# Patient Record
Sex: MALE | Race: OTHER | Employment: OTHER | URBAN - METROPOLITAN AREA
[De-identification: names, ages, dates, MRNs, and addresses within clinical notes are randomized per-mention and may not be internally consistent; named-entity substitution may affect disease eponyms.]

---

## 2020-01-03 ENCOUNTER — APPOINTMENT (OUTPATIENT)
Dept: CT IMAGING | Age: 45
End: 2020-01-03
Attending: EMERGENCY MEDICINE
Payer: COMMERCIAL

## 2020-01-03 ENCOUNTER — HOSPITAL ENCOUNTER (EMERGENCY)
Age: 45
Discharge: HOME | End: 2020-01-03
Attending: EMERGENCY MEDICINE
Payer: COMMERCIAL

## 2020-01-03 VITALS
HEIGHT: 74 IN | OXYGEN SATURATION: 94 % | SYSTOLIC BLOOD PRESSURE: 111 MMHG | DIASTOLIC BLOOD PRESSURE: 74 MMHG | TEMPERATURE: 98 F | BODY MASS INDEX: 28.23 KG/M2 | HEART RATE: 79 BPM | WEIGHT: 220 LBS | RESPIRATION RATE: 17 BRPM

## 2020-01-03 DIAGNOSIS — G44.009 CLUSTER HEADACHE, NOT INTRACTABLE, UNSPECIFIED CHRONICITY PATTERN: Primary | ICD-10-CM

## 2020-01-03 LAB
ALBUMIN SERPL-MCNC: 4 G/DL (ref 3.5–5)
ALBUMIN/GLOB SERPL: 1.1 {RATIO} (ref 1.1–2.2)
ALP SERPL-CCNC: 106 U/L (ref 45–117)
ALT SERPL-CCNC: 33 U/L (ref 12–78)
ANION GAP SERPL CALC-SCNC: 5 MMOL/L (ref 5–15)
AST SERPL-CCNC: 16 U/L (ref 15–37)
BASOPHILS # BLD: 0 K/UL (ref 0–0.1)
BASOPHILS NFR BLD: 0 % (ref 0–1)
BILIRUB SERPL-MCNC: 1.1 MG/DL (ref 0.2–1)
BUN SERPL-MCNC: 16 MG/DL (ref 6–20)
BUN/CREAT SERPL: 18 (ref 12–20)
CALCIUM SERPL-MCNC: 8.9 MG/DL (ref 8.5–10.1)
CHLORIDE SERPL-SCNC: 103 MMOL/L (ref 97–108)
CO2 SERPL-SCNC: 27 MMOL/L (ref 21–32)
COMMENT, HOLDF: NORMAL
CREAT SERPL-MCNC: 0.91 MG/DL (ref 0.7–1.3)
CRP SERPL-MCNC: 2.5 MG/DL (ref 0–0.6)
DIFFERENTIAL METHOD BLD: ABNORMAL
EOSINOPHIL # BLD: 0 K/UL (ref 0–0.4)
EOSINOPHIL NFR BLD: 0 % (ref 0–7)
ERYTHROCYTE [DISTWIDTH] IN BLOOD BY AUTOMATED COUNT: 12.8 % (ref 11.5–14.5)
ERYTHROCYTE [SEDIMENTATION RATE] IN BLOOD: 7 MM/HR (ref 0–15)
GLOBULIN SER CALC-MCNC: 3.8 G/DL (ref 2–4)
GLUCOSE SERPL-MCNC: 129 MG/DL (ref 65–100)
HCT VFR BLD AUTO: 48.4 % (ref 36.6–50.3)
HGB BLD-MCNC: 17 G/DL (ref 12.1–17)
IMM GRANULOCYTES # BLD AUTO: 0 K/UL (ref 0–0.04)
IMM GRANULOCYTES NFR BLD AUTO: 0 % (ref 0–0.5)
LYMPHOCYTES # BLD: 1.6 K/UL (ref 0.8–3.5)
LYMPHOCYTES NFR BLD: 12 % (ref 12–49)
MCH RBC QN AUTO: 29.8 PG (ref 26–34)
MCHC RBC AUTO-ENTMCNC: 35.1 G/DL (ref 30–36.5)
MCV RBC AUTO: 84.9 FL (ref 80–99)
MONOCYTES # BLD: 1.1 K/UL (ref 0–1)
MONOCYTES NFR BLD: 9 % (ref 5–13)
NEUTS SEG # BLD: 9.9 K/UL (ref 1.8–8)
NEUTS SEG NFR BLD: 79 % (ref 32–75)
NRBC # BLD: 0 K/UL (ref 0–0.01)
NRBC BLD-RTO: 0 PER 100 WBC
PLATELET # BLD AUTO: 160 K/UL (ref 150–400)
PMV BLD AUTO: 12.3 FL (ref 8.9–12.9)
POTASSIUM SERPL-SCNC: 3.4 MMOL/L (ref 3.5–5.1)
PROT SERPL-MCNC: 7.8 G/DL (ref 6.4–8.2)
RBC # BLD AUTO: 5.7 M/UL (ref 4.1–5.7)
SAMPLES BEING HELD,HOLD: NORMAL
SODIUM SERPL-SCNC: 135 MMOL/L (ref 136–145)
WBC # BLD AUTO: 12.6 K/UL (ref 4.1–11.1)

## 2020-01-03 PROCEDURE — 80053 COMPREHEN METABOLIC PANEL: CPT

## 2020-01-03 PROCEDURE — 96375 TX/PRO/DX INJ NEW DRUG ADDON: CPT

## 2020-01-03 PROCEDURE — 96374 THER/PROPH/DIAG INJ IV PUSH: CPT

## 2020-01-03 PROCEDURE — 70450 CT HEAD/BRAIN W/O DYE: CPT

## 2020-01-03 PROCEDURE — 86140 C-REACTIVE PROTEIN: CPT

## 2020-01-03 PROCEDURE — 85025 COMPLETE CBC W/AUTO DIFF WBC: CPT

## 2020-01-03 PROCEDURE — 74011250636 HC RX REV CODE- 250/636: Performed by: EMERGENCY MEDICINE

## 2020-01-03 PROCEDURE — 85652 RBC SED RATE AUTOMATED: CPT

## 2020-01-03 PROCEDURE — 99284 EMERGENCY DEPT VISIT MOD MDM: CPT

## 2020-01-03 RX ORDER — DIPHENHYDRAMINE HYDROCHLORIDE 50 MG/ML
50 INJECTION, SOLUTION INTRAMUSCULAR; INTRAVENOUS
Status: COMPLETED | OUTPATIENT
Start: 2020-01-03 | End: 2020-01-03

## 2020-01-03 RX ORDER — NAPROXEN 500 MG/1
500 TABLET ORAL 2 TIMES DAILY WITH MEALS
Qty: 30 TAB | Refills: 0 | Status: SHIPPED | OUTPATIENT
Start: 2020-01-03 | End: 2020-01-08

## 2020-01-03 RX ORDER — METOCLOPRAMIDE HYDROCHLORIDE 5 MG/ML
10 INJECTION INTRAMUSCULAR; INTRAVENOUS
Status: COMPLETED | OUTPATIENT
Start: 2020-01-03 | End: 2020-01-03

## 2020-01-03 RX ORDER — KETOROLAC TROMETHAMINE 30 MG/ML
30 INJECTION, SOLUTION INTRAMUSCULAR; INTRAVENOUS
Status: COMPLETED | OUTPATIENT
Start: 2020-01-03 | End: 2020-01-03

## 2020-01-03 RX ORDER — ONDANSETRON 8 MG/1
8 TABLET, ORALLY DISINTEGRATING ORAL
Qty: 15 TAB | Refills: 0 | Status: SHIPPED | OUTPATIENT
Start: 2020-01-03 | End: 2020-01-08

## 2020-01-03 RX ADMIN — DIPHENHYDRAMINE HYDROCHLORIDE 50 MG: 50 INJECTION, SOLUTION INTRAMUSCULAR; INTRAVENOUS at 05:43

## 2020-01-03 RX ADMIN — METOCLOPRAMIDE 10 MG: 5 INJECTION, SOLUTION INTRAMUSCULAR; INTRAVENOUS at 05:43

## 2020-01-03 RX ADMIN — SODIUM CHLORIDE 1000 ML: 900 INJECTION, SOLUTION INTRAVENOUS at 05:30

## 2020-01-03 RX ADMIN — KETOROLAC TROMETHAMINE 30 MG: 30 INJECTION, SOLUTION INTRAMUSCULAR at 05:43

## 2020-01-03 NOTE — ED TRIAGE NOTES
Pt C/O intermittent right sided headache x 1 month. Pain has become constant. Pt denies any weakness or visual changes. Pt has not had a head CT done.

## 2020-01-03 NOTE — DISCHARGE INSTRUCTIONS
Patient Education        Cluster Headache: Care Instructions  Your Care Instructions  Cluster headaches are very painful. They happen on one side of the head and often start at night. They can last for 30 minutes to several hours. They usually occur in groups, or clusters, over weeks or months. You may have a stuffy nose and watery eyes during the headaches. The cause of cluster headaches is not known. Medicine may help prevent cluster headaches. You also can try to avoid things that trigger your headaches. Follow-up care is a key part of your treatment and safety. Be sure to make and go to all appointments, and call your doctor if you are having problems. It's also a good idea to know your test results and keep a list of the medicines you take. How can you care for yourself at home? · Watch for new symptoms with a headache. These include fever, weakness or numbness, vision changes, or confusion. They may be signs of a more serious problem. · Be safe with medicines. Take your medicines exactly as prescribed. Call your doctor if you think you are having a problem with your medicine. You will get more details on the specific medicines your doctor prescribes. · If your doctor recommends it, take an over-the-counter pain medicine, such as acetaminophen (Tylenol), ibuprofen (Advil, Motrin), or naproxen (Aleve). Read and follow all instructions on the label. · Do not take two or more pain medicines at the same time unless the doctor told you to. Many pain medicines have acetaminophen, which is Tylenol. Too much acetaminophen (Tylenol) can be harmful. · Carry medicine with you to quickly treat a headache. · Put ice or a cold pack on the area for 10 to 20 minutes at a time. Put a thin cloth between the ice and your skin. · If your doctor prescribed at-home oxygen therapy to stop a cluster headache, follow the directions for using it. To prevent cluster headaches  · Keep a headache diary.  Avoiding triggers may help you prevent headaches. Write down when a headache begins, how long it lasts, and what might have triggered it. This could include stress, alcohol, or certain foods. · Exercise daily to lower stress. · Limit caffeine by not drinking too much coffee, tea, or soda. But do not quit caffeine suddenly. This can also give you headaches. · Do not smoke or allow others to smoke around you. If you need help quitting, talk to your doctor about stop-smoking programs and medicines. These can increase your chances of quitting for good. · Tell your doctor if your headaches get worse and medicines do not help. You may need to try a different medicine. When should you call for help? Call 911 anytime you think you may need emergency care. For example, call if:    · You have symptoms of a stroke. These may include:  ? Sudden numbness, tingling, weakness, or loss of movement in your face, arm, or leg, especially on only one side of your body. ? Sudden vision changes. ? Sudden trouble speaking. ? Sudden confusion or trouble understanding simple statements. ? Sudden problems with walking or balance. ? A sudden, severe headache that is different from past headaches.    Call your doctor now or seek immediate medical care if:    · You have a fever with a stiff neck or a severe headache.     · You are sensitive to light or feel very sleepy or confused.     · You have new nausea and vomiting and you cannot keep down food or liquids.    Watch closely for changes in your health, and be sure to contact your doctor if:    · You have a headache that does not get better within 1 or 2 days.     · Your headaches get worse or happen more often. Where can you learn more? Go to http://obie-lima.info/. Enter U007 in the search box to learn more about \"Cluster Headache: Care Instructions. \"  Current as of: March 28, 2019  Content Version: 12.2  © 0052-0365 [employer].  Care instructions adapted under license by [address] (which disclaims liability or warranty for this information). If you have questions about a medical condition or this instruction, always ask your healthcare professional. [address] any warranty or liability for your use of this information.

## 2020-01-03 NOTE — ED NOTES
Dr. Yamilet Epstein gave and reviewed discharge instructions with the patient and caregiver. The patient and caregiver verbalized understanding. The patient and caregiver was given opportunity for questions. Patient discharged in stable condition to the waiting room via ambulatory with family.

## 2020-01-06 ENCOUNTER — TELEPHONE (OUTPATIENT)
Dept: FAMILY MEDICINE CLINIC | Age: 45
End: 2020-01-06

## 2020-01-06 ENCOUNTER — HOSPITAL ENCOUNTER (EMERGENCY)
Age: 45
Discharge: ANOTHER HOSPITAL | End: 2020-01-06
Attending: EMERGENCY MEDICINE | Admitting: EMERGENCY MEDICINE
Payer: COMMERCIAL

## 2020-01-06 ENCOUNTER — OFFICE VISIT (OUTPATIENT)
Dept: FAMILY MEDICINE CLINIC | Age: 45
End: 2020-01-06

## 2020-01-06 ENCOUNTER — HOSPITAL ENCOUNTER (OUTPATIENT)
Dept: CT IMAGING | Age: 45
Discharge: HOME | End: 2020-01-06
Attending: STUDENT IN AN ORGANIZED HEALTH CARE EDUCATION/TRAINING PROGRAM
Payer: COMMERCIAL

## 2020-01-06 ENCOUNTER — HOSPITAL ENCOUNTER (INPATIENT)
Age: 45
LOS: 1 days | Discharge: HOME | DRG: 159 | End: 2020-01-08
Attending: EMERGENCY MEDICINE | Admitting: INTERNAL MEDICINE
Payer: COMMERCIAL

## 2020-01-06 VITALS
TEMPERATURE: 99 F | SYSTOLIC BLOOD PRESSURE: 118 MMHG | OXYGEN SATURATION: 97 % | DIASTOLIC BLOOD PRESSURE: 80 MMHG | WEIGHT: 216 LBS | HEIGHT: 74 IN | RESPIRATION RATE: 16 BRPM | BODY MASS INDEX: 27.72 KG/M2 | HEART RATE: 74 BPM

## 2020-01-06 VITALS
SYSTOLIC BLOOD PRESSURE: 137 MMHG | HEART RATE: 97 BPM | HEIGHT: 74 IN | WEIGHT: 216 LBS | TEMPERATURE: 100.2 F | BODY MASS INDEX: 27.72 KG/M2 | OXYGEN SATURATION: 100 % | DIASTOLIC BLOOD PRESSURE: 79 MMHG | RESPIRATION RATE: 22 BRPM

## 2020-01-06 DIAGNOSIS — K12.2 ORAL ABSCESS: Primary | ICD-10-CM

## 2020-01-06 DIAGNOSIS — J02.0 STREP PHARYNGITIS: Primary | ICD-10-CM

## 2020-01-06 DIAGNOSIS — R51.9 NONINTRACTABLE HEADACHE, UNSPECIFIED CHRONICITY PATTERN, UNSPECIFIED HEADACHE TYPE: ICD-10-CM

## 2020-01-06 DIAGNOSIS — K12.2 LUDWIG'S ANGINA: Primary | ICD-10-CM

## 2020-01-06 DIAGNOSIS — J02.0 STREP PHARYNGITIS: ICD-10-CM

## 2020-01-06 LAB
ANION GAP SERPL CALC-SCNC: 7 MMOL/L (ref 5–15)
BASOPHILS # BLD: 0 K/UL (ref 0–0.1)
BASOPHILS NFR BLD: 0 % (ref 0–1)
BUN SERPL-MCNC: 17 MG/DL (ref 6–20)
BUN/CREAT SERPL: 23 (ref 12–20)
CALCIUM SERPL-MCNC: 9.3 MG/DL (ref 8.5–10.1)
CHLORIDE SERPL-SCNC: 102 MMOL/L (ref 97–108)
CO2 SERPL-SCNC: 25 MMOL/L (ref 21–32)
COMMENT, HOLDF: NORMAL
CREAT SERPL-MCNC: 0.74 MG/DL (ref 0.7–1.3)
DIFFERENTIAL METHOD BLD: ABNORMAL
EOSINOPHIL # BLD: 0 K/UL (ref 0–0.4)
EOSINOPHIL NFR BLD: 0 % (ref 0–7)
ERYTHROCYTE [DISTWIDTH] IN BLOOD BY AUTOMATED COUNT: 12.7 % (ref 11.5–14.5)
GLUCOSE SERPL-MCNC: 114 MG/DL (ref 65–100)
HCT VFR BLD AUTO: 45.3 % (ref 36.6–50.3)
HGB BLD-MCNC: 15.6 G/DL (ref 12.1–17)
IMM GRANULOCYTES # BLD AUTO: 0.1 K/UL (ref 0–0.04)
IMM GRANULOCYTES NFR BLD AUTO: 0 % (ref 0–0.5)
INR PPP: 1.1 (ref 0.9–1.1)
LACTATE BLD-SCNC: 0.78 MMOL/L (ref 0.4–2)
LYMPHOCYTES # BLD: 1 K/UL (ref 0.8–3.5)
LYMPHOCYTES NFR BLD: 6 % (ref 12–49)
MCH RBC QN AUTO: 29.3 PG (ref 26–34)
MCHC RBC AUTO-ENTMCNC: 34.4 G/DL (ref 30–36.5)
MCV RBC AUTO: 85.2 FL (ref 80–99)
MONOCYTES # BLD: 1.2 K/UL (ref 0–1)
MONOCYTES NFR BLD: 7 % (ref 5–13)
NEUTS SEG # BLD: 14.8 K/UL (ref 1.8–8)
NEUTS SEG NFR BLD: 87 % (ref 32–75)
NRBC # BLD: 0 K/UL (ref 0–0.01)
NRBC BLD-RTO: 0 PER 100 WBC
PLATELET # BLD AUTO: 188 K/UL (ref 150–400)
PMV BLD AUTO: 11.8 FL (ref 8.9–12.9)
POTASSIUM SERPL-SCNC: 3.5 MMOL/L (ref 3.5–5.1)
PROTHROMBIN TIME: 11.4 SEC (ref 9–11.1)
RBC # BLD AUTO: 5.32 M/UL (ref 4.1–5.7)
S PYO AG THROAT QL: POSITIVE
SAMPLES BEING HELD,HOLD: NORMAL
SODIUM SERPL-SCNC: 134 MMOL/L (ref 136–145)
VALID INTERNAL CONTROL?: YES
WBC # BLD AUTO: 17.1 K/UL (ref 4.1–11.1)

## 2020-01-06 PROCEDURE — 85025 COMPLETE CBC W/AUTO DIFF WBC: CPT

## 2020-01-06 PROCEDURE — 74011250636 HC RX REV CODE- 250/636: Performed by: PHYSICIAN ASSISTANT

## 2020-01-06 PROCEDURE — 83605 ASSAY OF LACTIC ACID: CPT

## 2020-01-06 PROCEDURE — 70491 CT SOFT TISSUE NECK W/DYE: CPT

## 2020-01-06 PROCEDURE — 87040 BLOOD CULTURE FOR BACTERIA: CPT

## 2020-01-06 PROCEDURE — 74011000258 HC RX REV CODE- 258: Performed by: EMERGENCY MEDICINE

## 2020-01-06 PROCEDURE — 80048 BASIC METABOLIC PNL TOTAL CA: CPT

## 2020-01-06 PROCEDURE — 99283 EMERGENCY DEPT VISIT LOW MDM: CPT

## 2020-01-06 PROCEDURE — 99284 EMERGENCY DEPT VISIT MOD MDM: CPT

## 2020-01-06 PROCEDURE — 96375 TX/PRO/DX INJ NEW DRUG ADDON: CPT

## 2020-01-06 PROCEDURE — 36415 COLL VENOUS BLD VENIPUNCTURE: CPT

## 2020-01-06 PROCEDURE — 96365 THER/PROPH/DIAG IV INF INIT: CPT

## 2020-01-06 PROCEDURE — 74011636320 HC RX REV CODE- 636/320

## 2020-01-06 PROCEDURE — 74011250636 HC RX REV CODE- 250/636: Performed by: EMERGENCY MEDICINE

## 2020-01-06 PROCEDURE — 85610 PROTHROMBIN TIME: CPT

## 2020-01-06 RX ORDER — DEXAMETHASONE SODIUM PHOSPHATE 10 MG/ML
10 INJECTION INTRAMUSCULAR; INTRAVENOUS
Status: COMPLETED | OUTPATIENT
Start: 2020-01-06 | End: 2020-01-06

## 2020-01-06 RX ORDER — VANCOMYCIN 2 GRAM/500 ML IN 0.9 % SODIUM CHLORIDE INTRAVENOUS
2000
Status: COMPLETED | OUTPATIENT
Start: 2020-01-06 | End: 2020-01-07

## 2020-01-06 RX ORDER — SODIUM CHLORIDE 9 MG/ML
150 INJECTION, SOLUTION INTRAVENOUS CONTINUOUS
Status: DISCONTINUED | OUTPATIENT
Start: 2020-01-06 | End: 2020-01-06 | Stop reason: HOSPADM

## 2020-01-06 RX ORDER — AMOXICILLIN 500 MG/1
500 CAPSULE ORAL 2 TIMES DAILY
Qty: 20 CAP | Refills: 0 | Status: SHIPPED | OUTPATIENT
Start: 2020-01-06 | End: 2020-01-08

## 2020-01-06 RX ORDER — MORPHINE SULFATE 2 MG/ML
4 INJECTION, SOLUTION INTRAMUSCULAR; INTRAVENOUS ONCE
Status: COMPLETED | OUTPATIENT
Start: 2020-01-06 | End: 2020-01-06

## 2020-01-06 RX ORDER — ONDANSETRON 2 MG/ML
4 INJECTION INTRAMUSCULAR; INTRAVENOUS
Status: COMPLETED | OUTPATIENT
Start: 2020-01-06 | End: 2020-01-06

## 2020-01-06 RX ADMIN — IOPAMIDOL 100 ML: 612 INJECTION, SOLUTION INTRAVENOUS at 15:22

## 2020-01-06 RX ADMIN — SODIUM CHLORIDE, SODIUM LACTATE, POTASSIUM CHLORIDE, AND CALCIUM CHLORIDE 1000 ML: 600; 310; 30; 20 INJECTION, SOLUTION INTRAVENOUS at 20:01

## 2020-01-06 RX ADMIN — PIPERACILLIN AND TAZOBACTAM 3.38 G: 3; .375 INJECTION, POWDER, LYOPHILIZED, FOR SOLUTION INTRAVENOUS at 20:12

## 2020-01-06 RX ADMIN — MORPHINE SULFATE 4 MG: 2 INJECTION, SOLUTION INTRAMUSCULAR; INTRAVENOUS at 20:40

## 2020-01-06 RX ADMIN — DEXAMETHASONE SODIUM PHOSPHATE 10 MG: 10 INJECTION, SOLUTION INTRAMUSCULAR; INTRAVENOUS at 20:01

## 2020-01-06 RX ADMIN — SODIUM CHLORIDE 150 ML/HR: 900 INJECTION, SOLUTION INTRAVENOUS at 20:43

## 2020-01-06 RX ADMIN — ONDANSETRON 4 MG: 2 INJECTION INTRAMUSCULAR; INTRAVENOUS at 20:40

## 2020-01-06 NOTE — TELEPHONE ENCOUNTER
Radiology at [address] [place] [address] called to speak to Dr. Amaya Rosales regarding the prelininary results for the patients CT. No nurse available at the time of call. I was informed to let Dr. Amaya Rosales know that there appears to be an abscess in the patients throat and to be on the lookout for the final result within the hour. (Supervisor notified me that she spoke to Dr. Amaya Rosales directly and informed him of this message.  He will keep an eye out for the results.)

## 2020-01-06 NOTE — TELEPHONE ENCOUNTER
Dr. David Rosales from [facility] called to give critical CT results to Dr. Anjana Stock. Please call Dr. David Rosales back at [phone].

## 2020-01-06 NOTE — LETTER
NOTIFICATION RETURN TO WORK / SCHOOL 
 
1/6/2020 12:05 PM 
 
Mr. Franny Em [address] [id number] [facility] [address] To Whom It May Concern: 
 
Franny Em is currently under the care of [facility]. He will return to work/school on: 1/13/19 or sooner if better If there are questions or concerns please have the patient contact our office.  
 
 
 
Sincerely, 
 
 
Bc Corley MD

## 2020-01-06 NOTE — PROGRESS NOTES
[address] [place] [address]   Office [phone], Fax [phone]    Subjective:     Chief Complaint   Patient presents with    Headache    Sore Throat     History provided by patient, wife, and daughter    HPI:  Joi Chiu is a 53 y.o. OTHER male presents for establish care and for ER f/u for headaches. Significant history pertinent to presentation includes cold sores. Establish care    Sore throat/Headache   - Pt was at the ED on 1/3/2020 for sore throat and headaches that started about a month ago. Pt was seen in [place] and told he had herpes and was treated with ibuprofen and acyclovir. The sore throat and HA improved for about 1-2 week but now it is back since Dec 29th. He is says it way worse than it was before and also has severe HA. HA is on the right side. +photosensitivity/senstive to sounds. Pt endorses hard time swallowing, mild right ear pain, and feels his tongue sensation has changed. He also had been sweating together with his HA. At the ED on 1/3/2020, he had CT head with no acute process and was given naproxen and told to f/u with PCP. Sick contact (co-worker a month ago). Denies fever, sob, cp, cough, abd pain. Medication reviewed. Allergy reviewed. Fmhx: DM2    SocHx. - Denies smoking, no alcohol use, no illcit drug use.  Hx of cocaine use (quit 2005)  - Occupation:  (machine)    ROS (bolded are positive):   General Negative for fever, chills, changes in weight, changes in appetite   HEENT Negative for hearing loss, earache, congestion, sore throat   CV Negative for chest pain, palpitations, edema   Respiratory Negative for cough, shortness of breath, wheezing   GI Negative for change in bowel habits, abdominal pain, black or bloody stools, nausea or vomiting    Negative for frequency, dysuria, hematuria, penile discharge   MSK Negative for back pain, joint pain, muscle pain   Skin Negative for itching, rash, hives   Neuro Negative for dizziness, headache, confusion, weakness   Psych Negative for anxiety, depression, change in mood     Objective:   Vitals - reviewed  Visit Vitals  /80   Pulse 74   Temp 99 °F (37.2 °C) (Oral)   Resp 16   Ht 6' 2\" (1.88 m)   Wt 216 lb (98 kg)   SpO2 97%   BMI 27.73 kg/m²      Physical exam:   GEN: NAD. Alert. Well nourished. EYES:  Conjunctiva clear; PERRLA. extraocular movements are intact. EAR: External ears are normal. Tympanic membranes are clear and without effusion. NOSE: Turbinates are within normal limits. No drainage  OROPHYARYNX: unable to open mouth as wide as pain in back of tongue/throat, increased secretions. NECK:  Supple; thyroid normal. +LAD  LUNGS: Respirations unlabored; CTAB. no wheeze, rales, rhonchi   CARDIOVASCULAR: Regular, rate, and rhythm without murmurs, gallops or rubs   ABDOMEN: Soft; NT, ND. +bowel sounds; no rebound tenderness, no guarding. no masses or organomegaly  NEUROLOGIC:  No focal neurologic deficits. Strength and sensation grossly intact. MSK: FROM in all extremities (both passive and active). Good tone. No vertebral tenderness. EXT: Well perfused. No edema. No erythema. PSYCH: appropriate mood and affect. Good insight and judgement. Cooperative. SKIN: No obvious rashes or lesions. Pertinent Labs/Studies:   - Strep positive     Assessment and orders:     51yo M previously healthy here f/u for ER for headache and sore throat. Pt is positive for strep pharyngitis. Will rule out tonsillar abscess with CT scan stat. Given appt for 2 pm at [facility]. To  amoxicillin and start taking it asap. Precautions given to go to ED. ICD-10-CM ICD-9-CM    1. Strep pharyngitis J02.0 034.0 AMB POC RAPID STREP A      CT NECK SOFT TISSUE W CONT   2.  Nonintractable headache, unspecified chronicity pattern, unspecified headache type R51 784.0      Follow-up and Dispositions    · Return in about 10 days (around 1/16/2020), or if symptoms worsen or fail to improve. Pt was discussed with Dr Orville Beasley (attending physician). I have reviewed patient medical and social history and medications. I have reviewed pertinent labs results and other data. I have discussed the diagnosis with the patient and the intended plan as seen in the above orders. The patient has received an after-visit summary and questions were answered concerning future plans. I have discussed medication side effects and warnings with the patient as well.     Donald Marie MD  Resident [facility]  01/06/20

## 2020-01-06 NOTE — PROGRESS NOTES
Chief Complaint   Patient presents with    Headache    Sore Throat     1. Have you been to the ER, urgent care clinic since your last visit? Hospitalized since your last visit? Yes. [employer]. 2. Have you seen or consulted any other health care providers outside of the [address] [place] [address] since your last visit? Include any pap smears or colon screening.  No

## 2020-01-06 NOTE — TELEPHONE ENCOUNTER
Patient daughter Shanelle Vila) on hippa calling,    Notes that her father just had imaging of his neck at hospital. She states the nurse told her to contact Dr. Katherine Mac ask that he provide patient with pain medications for pain.     Questions call [phone]

## 2020-01-06 NOTE — PATIENT INSTRUCTIONS
[facility] @ 2pm to outpatient registration. Nothing to eat or drinking. Water only. Dolor de garganta: Instrucciones de cuidado - [ Sore Throat: Care Instructions ]  Instrucciones de cuidado    Adelina infección por un virus o adelina bacteria causa la mayoría de los cathryn de garganta. El humo del cigarrillo, el aire seco, el aire contaminado, las [place] y gritar también pueden causar dolor de garganta. El dolor de garganta puede ser intenso y [place]. Por eulalio, la mayoría de los cathryn de garganta desaparecen por sí mismos. Si tiene [employer], el médico podría recetarle antibióticos. La atención de seguimiento es adelina parte clave de henderson tratamiento y seguridad. Asegúrese de hacer y acudir a todas las citas, y llame a henderson médico si está teniendo problemas. También es adelina buena idea saber los resultados de john exámenes y mantener adelina lista de los medicamentos que yue. ¿Cómo puede cuidarse en el hogar? · Si henderson médico le recetó antibióticos, tómelos según las indicaciones. No deje de tomarlos por el hecho de sentirse mejor. Debe mariangel todos los antibióticos hasta terminarlos. · Antonia gárgaras de agua salada tibia adelina vez cada hora para ayudar a reducir la hinchazón y aliviar la molestia. Mezcle 1 cucharadita de sal en 1 taza de agua tibia. · [place] un analgésico (medicamento para el dolor) de venta prachi, vic acetaminofén (Tylenol), ibuprofeno (Advil, Motrin) o naproxeno (Aleve). Maribel y siga todas las instrucciones de la [place]. · Tenga cuidado cuando tome medicamentos de venta prachi para el resfriado o la gripe y Tylenol al [employer]. Muchos de estos medicamentos contienen acetaminofén, o sea, Tylenol. Maribel las etiquetas para asegurarse de que no está tomando adelina dosis mayor que la recomendada. El exceso de acetaminofén (Tylenol) puede ser dañino. · [place] abundantes líquidos. Los líquidos podrían ayudar a aliviar la irritación de la garganta.  Beber líquidos calientes, vic té o sopa, podría ayudarle a reducir el dolor de garganta. · Chupe pastillas para la garganta de venta prachi para aliviar el dolor. Los caramelos duros o los caramelos regulares para la tos también podrían ayudar. Nunca se los dé a un anca pequeño porque corre el riesgo de atragantarse. · No fume ni permita que otros fumen cerca de usted. Si necesita ayuda para dejar de fumar, hable con henderson médico [employer] y medicamentos para dejar de fumar. Estos pueden aumentar john probabilidades de dejar el hábito para siempre. · Use un humidificador o vaporizador para añadir humedad en henderson dormitorio. Siga las instrucciones para limpiar el aparato. ¿Cuándo debe pedir ayuda? Llame a henderson médico ahora mismo o busque atención médica inmediata si:    · Tiene dificultades para tragar o estas empeoran.     · El dolor de garganta empeora mucho en un lado.    Preste especial atención a los cambios en henderson heidy y asegúrese de comunicarse con henderson médico si no mejora vic se esperaba. ¿Dónde puede encontrar más información en inglés? Octavia Spangler a http://obie-lima.info/. Davi Dietz S733 en la búsqueda para aprender más acerca de \"Dolor de garganta: Instrucciones de cuidado - [ Sore Throat: Care Instructions ]. \"  Revisado: 21 octubre, 2018  Versión del contenido: 12.2  © 3106-0277 [employer]wise, Incorporated. Las instrucciones de cuidado fueron adaptadas bajo licencia por [employer] (which disclaims liability or warranty for this information). Si usted tiene [place] afección médica o sobre estas instrucciones, siempre pregunte a henderson profesional de heidy. Healthwise, Incorporated niega toda garantía o responsabilidad por henderson uso de esta información.

## 2020-01-07 ENCOUNTER — APPOINTMENT (OUTPATIENT)
Dept: GENERAL RADIOLOGY | Age: 45
DRG: 159 | End: 2020-01-07
Attending: INTERNAL MEDICINE
Payer: COMMERCIAL

## 2020-01-07 ENCOUNTER — ANESTHESIA (OUTPATIENT)
Dept: SURGERY | Age: 45
DRG: 159 | End: 2020-01-07
Payer: COMMERCIAL

## 2020-01-07 ENCOUNTER — ANESTHESIA EVENT (OUTPATIENT)
Dept: SURGERY | Age: 45
DRG: 159 | End: 2020-01-07
Payer: COMMERCIAL

## 2020-01-07 PROBLEM — K12.2 ABSCESS OF ORAL SPACE: Status: ACTIVE | Noted: 2020-01-07

## 2020-01-07 LAB
ALBUMIN SERPL-MCNC: 3.1 G/DL (ref 3.5–5)
ALBUMIN/GLOB SERPL: 0.6 {RATIO} (ref 1.1–2.2)
ALP SERPL-CCNC: 136 U/L (ref 45–117)
ALT SERPL-CCNC: 32 U/L (ref 12–78)
ANION GAP SERPL CALC-SCNC: 8 MMOL/L (ref 5–15)
AST SERPL-CCNC: 15 U/L (ref 15–37)
BASOPHILS # BLD: 0 K/UL (ref 0–0.1)
BASOPHILS NFR BLD: 0 % (ref 0–1)
BILIRUB SERPL-MCNC: 1.6 MG/DL (ref 0.2–1)
BUN SERPL-MCNC: 15 MG/DL (ref 6–20)
BUN/CREAT SERPL: 20 (ref 12–20)
CALCIUM SERPL-MCNC: 9.2 MG/DL (ref 8.5–10.1)
CHLORIDE SERPL-SCNC: 105 MMOL/L (ref 97–108)
CO2 SERPL-SCNC: 23 MMOL/L (ref 21–32)
CREAT SERPL-MCNC: 0.76 MG/DL (ref 0.7–1.3)
CRP SERPL-MCNC: 17.7 MG/DL (ref 0–0.6)
DIFFERENTIAL METHOD BLD: ABNORMAL
EOSINOPHIL # BLD: 0 K/UL (ref 0–0.4)
EOSINOPHIL NFR BLD: 0 % (ref 0–7)
ERYTHROCYTE [DISTWIDTH] IN BLOOD BY AUTOMATED COUNT: 13.1 % (ref 11.5–14.5)
EST. AVERAGE GLUCOSE BLD GHB EST-MCNC: 120 MG/DL
GLOBULIN SER CALC-MCNC: 5.1 G/DL (ref 2–4)
GLUCOSE SERPL-MCNC: 152 MG/DL (ref 65–100)
HBA1C MFR BLD: 5.8 % (ref 4–5.6)
HCT VFR BLD AUTO: 45.9 % (ref 36.6–50.3)
HGB BLD-MCNC: 15.3 G/DL (ref 12.1–17)
IMM GRANULOCYTES # BLD AUTO: 0.2 K/UL (ref 0–0.04)
IMM GRANULOCYTES NFR BLD AUTO: 1 % (ref 0–0.5)
LYMPHOCYTES # BLD: 0.7 K/UL (ref 0.8–3.5)
LYMPHOCYTES NFR BLD: 4 % (ref 12–49)
MAGNESIUM SERPL-MCNC: 1.9 MG/DL (ref 1.6–2.4)
MCH RBC QN AUTO: 29 PG (ref 26–34)
MCHC RBC AUTO-ENTMCNC: 33.3 G/DL (ref 30–36.5)
MCV RBC AUTO: 86.9 FL (ref 80–99)
MONOCYTES # BLD: 0.3 K/UL (ref 0–1)
MONOCYTES NFR BLD: 2 % (ref 5–13)
NEUTS SEG # BLD: 15.7 K/UL (ref 1.8–8)
NEUTS SEG NFR BLD: 93 % (ref 32–75)
NRBC # BLD: 0 K/UL (ref 0–0.01)
NRBC BLD-RTO: 0 PER 100 WBC
PHOSPHATE SERPL-MCNC: 2.4 MG/DL (ref 2.6–4.7)
PLATELET # BLD AUTO: 171 K/UL (ref 150–400)
PMV BLD AUTO: 11.8 FL (ref 8.9–12.9)
POTASSIUM SERPL-SCNC: 3.8 MMOL/L (ref 3.5–5.1)
PROT SERPL-MCNC: 8.2 G/DL (ref 6.4–8.2)
RBC # BLD AUTO: 5.28 M/UL (ref 4.1–5.7)
RBC MORPH BLD: ABNORMAL
SODIUM SERPL-SCNC: 136 MMOL/L (ref 136–145)
TSH SERPL DL<=0.05 MIU/L-ACNC: 0.47 UIU/ML (ref 0.36–3.74)
WBC # BLD AUTO: 16.9 K/UL (ref 4.1–11.1)

## 2020-01-07 PROCEDURE — 77030008703 HC TU ET UNCUF COVD -A: Performed by: ANESTHESIOLOGY

## 2020-01-07 PROCEDURE — 74011250636 HC RX REV CODE- 250/636: Performed by: NURSE ANESTHETIST, CERTIFIED REGISTERED

## 2020-01-07 PROCEDURE — 36415 COLL VENOUS BLD VENIPUNCTURE: CPT

## 2020-01-07 PROCEDURE — 77030011267 HC ELECTRD BLD COVD -A: Performed by: OTOLARYNGOLOGY

## 2020-01-07 PROCEDURE — 74011000250 HC RX REV CODE- 250

## 2020-01-07 PROCEDURE — 74011000250 HC RX REV CODE- 250: Performed by: INTERNAL MEDICINE

## 2020-01-07 PROCEDURE — 0W933ZZ DRAINAGE OF ORAL CAVITY AND THROAT, PERCUTANEOUS APPROACH: ICD-10-PCS | Performed by: OTOLARYNGOLOGY

## 2020-01-07 PROCEDURE — 83036 HEMOGLOBIN GLYCOSYLATED A1C: CPT

## 2020-01-07 PROCEDURE — 86140 C-REACTIVE PROTEIN: CPT

## 2020-01-07 PROCEDURE — 77030018836 HC SOL IRR NACL ICUM -A: Performed by: OTOLARYNGOLOGY

## 2020-01-07 PROCEDURE — 84100 ASSAY OF PHOSPHORUS: CPT

## 2020-01-07 PROCEDURE — 74011250636 HC RX REV CODE- 250/636: Performed by: OTOLARYNGOLOGY

## 2020-01-07 PROCEDURE — 74011250636 HC RX REV CODE- 250/636: Performed by: INTERNAL MEDICINE

## 2020-01-07 PROCEDURE — 76010000138 HC OR TIME 0.5 TO 1 HR: Performed by: OTOLARYNGOLOGY

## 2020-01-07 PROCEDURE — 87147 CULTURE TYPE IMMUNOLOGIC: CPT

## 2020-01-07 PROCEDURE — 74011000250 HC RX REV CODE- 250: Performed by: NURSE ANESTHETIST, CERTIFIED REGISTERED

## 2020-01-07 PROCEDURE — 77030040922 HC BLNKT HYPOTHRM STRY -A

## 2020-01-07 PROCEDURE — 85025 COMPLETE CBC W/AUTO DIFF WBC: CPT

## 2020-01-07 PROCEDURE — 76060000032 HC ANESTHESIA 0.5 TO 1 HR: Performed by: OTOLARYNGOLOGY

## 2020-01-07 PROCEDURE — 74011250636 HC RX REV CODE- 250/636: Performed by: EMERGENCY MEDICINE

## 2020-01-07 PROCEDURE — 77030011640 HC PAD GRND REM COVD -A: Performed by: OTOLARYNGOLOGY

## 2020-01-07 PROCEDURE — 76210000016 HC OR PH I REC 1 TO 1.5 HR: Performed by: OTOLARYNGOLOGY

## 2020-01-07 PROCEDURE — 71045 X-RAY EXAM CHEST 1 VIEW: CPT

## 2020-01-07 PROCEDURE — 83735 ASSAY OF MAGNESIUM: CPT

## 2020-01-07 PROCEDURE — 77030040361 HC SLV COMPR DVT MDII -B: Performed by: OTOLARYNGOLOGY

## 2020-01-07 PROCEDURE — 87205 SMEAR GRAM STAIN: CPT

## 2020-01-07 PROCEDURE — 74011250636 HC RX REV CODE- 250/636

## 2020-01-07 PROCEDURE — 74011000258 HC RX REV CODE- 258: Performed by: INTERNAL MEDICINE

## 2020-01-07 PROCEDURE — 80053 COMPREHEN METABOLIC PANEL: CPT

## 2020-01-07 PROCEDURE — 84443 ASSAY THYROID STIM HORMONE: CPT

## 2020-01-07 PROCEDURE — 74011250636 HC RX REV CODE- 250/636: Performed by: ANESTHESIOLOGY

## 2020-01-07 PROCEDURE — 77030002996 HC SUT SLK J&J -A: Performed by: OTOLARYNGOLOGY

## 2020-01-07 PROCEDURE — 65270000029 HC RM PRIVATE

## 2020-01-07 PROCEDURE — 74011000250 HC RX REV CODE- 250: Performed by: OTOLARYNGOLOGY

## 2020-01-07 RX ORDER — SODIUM CHLORIDE 9 MG/ML
1000 INJECTION, SOLUTION INTRAVENOUS CONTINUOUS
Status: DISCONTINUED | OUTPATIENT
Start: 2020-01-07 | End: 2020-01-07 | Stop reason: HOSPADM

## 2020-01-07 RX ORDER — SUCCINYLCHOLINE CHLORIDE 20 MG/ML
INJECTION INTRAMUSCULAR; INTRAVENOUS AS NEEDED
Status: DISCONTINUED | OUTPATIENT
Start: 2020-01-07 | End: 2020-01-07 | Stop reason: HOSPADM

## 2020-01-07 RX ORDER — FENTANYL CITRATE 50 UG/ML
INJECTION, SOLUTION INTRAMUSCULAR; INTRAVENOUS
Status: COMPLETED
Start: 2020-01-07 | End: 2020-01-07

## 2020-01-07 RX ORDER — SODIUM CHLORIDE, SODIUM LACTATE, POTASSIUM CHLORIDE, CALCIUM CHLORIDE 600; 310; 30; 20 MG/100ML; MG/100ML; MG/100ML; MG/100ML
100 INJECTION, SOLUTION INTRAVENOUS CONTINUOUS
Status: DISCONTINUED | OUTPATIENT
Start: 2020-01-07 | End: 2020-01-08 | Stop reason: HOSPADM

## 2020-01-07 RX ORDER — ACETAMINOPHEN 325 MG/1
650 TABLET ORAL ONCE
Status: CANCELLED | OUTPATIENT
Start: 2020-01-07 | End: 2020-01-07

## 2020-01-07 RX ORDER — FENTANYL CITRATE 50 UG/ML
INJECTION, SOLUTION INTRAMUSCULAR; INTRAVENOUS AS NEEDED
Status: DISCONTINUED | OUTPATIENT
Start: 2020-01-07 | End: 2020-01-07 | Stop reason: HOSPADM

## 2020-01-07 RX ORDER — MIDAZOLAM HYDROCHLORIDE 1 MG/ML
1 INJECTION, SOLUTION INTRAMUSCULAR; INTRAVENOUS AS NEEDED
Status: CANCELLED | OUTPATIENT
Start: 2020-01-07

## 2020-01-07 RX ORDER — MIDAZOLAM HYDROCHLORIDE 1 MG/ML
0.5 INJECTION, SOLUTION INTRAMUSCULAR; INTRAVENOUS
Status: DISCONTINUED | OUTPATIENT
Start: 2020-01-07 | End: 2020-01-07 | Stop reason: HOSPADM

## 2020-01-07 RX ORDER — GLYCOPYRROLATE 0.2 MG/ML
INJECTION INTRAMUSCULAR; INTRAVENOUS AS NEEDED
Status: DISCONTINUED | OUTPATIENT
Start: 2020-01-07 | End: 2020-01-07 | Stop reason: HOSPADM

## 2020-01-07 RX ORDER — SODIUM CHLORIDE 0.9 % (FLUSH) 0.9 %
5-40 SYRINGE (ML) INJECTION EVERY 8 HOURS
Status: DISCONTINUED | OUTPATIENT
Start: 2020-01-07 | End: 2020-01-08 | Stop reason: HOSPADM

## 2020-01-07 RX ORDER — SODIUM CHLORIDE 9 MG/ML
50 INJECTION, SOLUTION INTRAVENOUS CONTINUOUS
Status: CANCELLED | OUTPATIENT
Start: 2020-01-07 | End: 2020-01-08

## 2020-01-07 RX ORDER — EPHEDRINE SULFATE/0.9% NACL/PF 50 MG/5 ML
5 SYRINGE (ML) INTRAVENOUS AS NEEDED
Status: DISCONTINUED | OUTPATIENT
Start: 2020-01-07 | End: 2020-01-07 | Stop reason: HOSPADM

## 2020-01-07 RX ORDER — VANCOMYCIN HYDROCHLORIDE
1250 EVERY 8 HOURS
Status: DISCONTINUED | OUTPATIENT
Start: 2020-01-07 | End: 2020-01-08

## 2020-01-07 RX ORDER — ONDANSETRON 2 MG/ML
4 INJECTION INTRAMUSCULAR; INTRAVENOUS
Status: DISCONTINUED | OUTPATIENT
Start: 2020-01-07 | End: 2020-01-08 | Stop reason: HOSPADM

## 2020-01-07 RX ORDER — GLYCOPYRROLATE 0.2 MG/ML
0.2 INJECTION INTRAMUSCULAR; INTRAVENOUS ONCE
Status: COMPLETED | OUTPATIENT
Start: 2020-01-07 | End: 2020-01-07

## 2020-01-07 RX ORDER — ONDANSETRON 2 MG/ML
INJECTION INTRAMUSCULAR; INTRAVENOUS AS NEEDED
Status: DISCONTINUED | OUTPATIENT
Start: 2020-01-07 | End: 2020-01-07 | Stop reason: HOSPADM

## 2020-01-07 RX ORDER — SODIUM CHLORIDE, SODIUM LACTATE, POTASSIUM CHLORIDE, CALCIUM CHLORIDE 600; 310; 30; 20 MG/100ML; MG/100ML; MG/100ML; MG/100ML
125 INJECTION, SOLUTION INTRAVENOUS CONTINUOUS
Status: DISCONTINUED | OUTPATIENT
Start: 2020-01-07 | End: 2020-01-07 | Stop reason: HOSPADM

## 2020-01-07 RX ORDER — DIPHENHYDRAMINE HYDROCHLORIDE 50 MG/ML
12.5 INJECTION, SOLUTION INTRAMUSCULAR; INTRAVENOUS AS NEEDED
Status: DISCONTINUED | OUTPATIENT
Start: 2020-01-07 | End: 2020-01-07 | Stop reason: HOSPADM

## 2020-01-07 RX ORDER — OXYCODONE AND ACETAMINOPHEN 5; 325 MG/1; MG/1
1 TABLET ORAL AS NEEDED
Status: DISCONTINUED | OUTPATIENT
Start: 2020-01-07 | End: 2020-01-07 | Stop reason: HOSPADM

## 2020-01-07 RX ORDER — PROPOFOL 10 MG/ML
INJECTION, EMULSION INTRAVENOUS AS NEEDED
Status: DISCONTINUED | OUTPATIENT
Start: 2020-01-07 | End: 2020-01-07 | Stop reason: HOSPADM

## 2020-01-07 RX ORDER — NEOSTIGMINE METHYLSULFATE 1 MG/ML
INJECTION INTRAVENOUS AS NEEDED
Status: DISCONTINUED | OUTPATIENT
Start: 2020-01-07 | End: 2020-01-07 | Stop reason: HOSPADM

## 2020-01-07 RX ORDER — SODIUM CHLORIDE 0.9 % (FLUSH) 0.9 %
5-40 SYRINGE (ML) INJECTION AS NEEDED
Status: DISCONTINUED | OUTPATIENT
Start: 2020-01-07 | End: 2020-01-08 | Stop reason: HOSPADM

## 2020-01-07 RX ORDER — HYDROMORPHONE HYDROCHLORIDE 1 MG/ML
0.2 INJECTION, SOLUTION INTRAMUSCULAR; INTRAVENOUS; SUBCUTANEOUS
Status: DISCONTINUED | OUTPATIENT
Start: 2020-01-07 | End: 2020-01-07 | Stop reason: HOSPADM

## 2020-01-07 RX ORDER — MORPHINE SULFATE 10 MG/ML
2 INJECTION, SOLUTION INTRAMUSCULAR; INTRAVENOUS
Status: DISCONTINUED | OUTPATIENT
Start: 2020-01-07 | End: 2020-01-07 | Stop reason: HOSPADM

## 2020-01-07 RX ORDER — KETOROLAC TROMETHAMINE 30 MG/ML
15 INJECTION, SOLUTION INTRAMUSCULAR; INTRAVENOUS
Status: DISCONTINUED | OUTPATIENT
Start: 2020-01-07 | End: 2020-01-07

## 2020-01-07 RX ORDER — DEXAMETHASONE SODIUM PHOSPHATE 10 MG/ML
10 INJECTION INTRAMUSCULAR; INTRAVENOUS EVERY 8 HOURS
Status: DISCONTINUED | OUTPATIENT
Start: 2020-01-07 | End: 2020-01-08 | Stop reason: HOSPADM

## 2020-01-07 RX ORDER — GLYCOPYRROLATE 0.2 MG/ML
INJECTION INTRAMUSCULAR; INTRAVENOUS
Status: COMPLETED
Start: 2020-01-07 | End: 2020-01-07

## 2020-01-07 RX ORDER — LIDOCAINE HYDROCHLORIDE 20 MG/ML
INJECTION, SOLUTION EPIDURAL; INFILTRATION; INTRACAUDAL; PERINEURAL AS NEEDED
Status: DISCONTINUED | OUTPATIENT
Start: 2020-01-07 | End: 2020-01-07 | Stop reason: HOSPADM

## 2020-01-07 RX ORDER — FENTANYL CITRATE 50 UG/ML
25 INJECTION, SOLUTION INTRAMUSCULAR; INTRAVENOUS
Status: COMPLETED | OUTPATIENT
Start: 2020-01-07 | End: 2020-01-07

## 2020-01-07 RX ORDER — SODIUM CHLORIDE 0.9 % (FLUSH) 0.9 %
5-40 SYRINGE (ML) INJECTION EVERY 8 HOURS
Status: CANCELLED | OUTPATIENT
Start: 2020-01-07

## 2020-01-07 RX ORDER — SODIUM CHLORIDE 0.9 % (FLUSH) 0.9 %
5-40 SYRINGE (ML) INJECTION EVERY 8 HOURS
Status: DISCONTINUED | OUTPATIENT
Start: 2020-01-07 | End: 2020-01-07 | Stop reason: HOSPADM

## 2020-01-07 RX ORDER — OXYMETAZOLINE HCL 0.05 %
2 SPRAY, NON-AEROSOL (ML) NASAL
Status: DISPENSED | OUTPATIENT
Start: 2020-01-07 | End: 2020-01-07

## 2020-01-07 RX ORDER — SODIUM CHLORIDE, SODIUM LACTATE, POTASSIUM CHLORIDE, CALCIUM CHLORIDE 600; 310; 30; 20 MG/100ML; MG/100ML; MG/100ML; MG/100ML
125 INJECTION, SOLUTION INTRAVENOUS CONTINUOUS
Status: CANCELLED | OUTPATIENT
Start: 2020-01-07 | End: 2020-01-08

## 2020-01-07 RX ORDER — LIDOCAINE HYDROCHLORIDE AND EPINEPHRINE 10; 10 MG/ML; UG/ML
INJECTION, SOLUTION INFILTRATION; PERINEURAL AS NEEDED
Status: DISCONTINUED | OUTPATIENT
Start: 2020-01-07 | End: 2020-01-07 | Stop reason: HOSPADM

## 2020-01-07 RX ORDER — FACIAL-BODY WIPES
10 EACH TOPICAL DAILY PRN
Status: DISCONTINUED | OUTPATIENT
Start: 2020-01-07 | End: 2020-01-08 | Stop reason: HOSPADM

## 2020-01-07 RX ORDER — ROCURONIUM BROMIDE 10 MG/ML
INJECTION, SOLUTION INTRAVENOUS AS NEEDED
Status: DISCONTINUED | OUTPATIENT
Start: 2020-01-07 | End: 2020-01-07 | Stop reason: HOSPADM

## 2020-01-07 RX ORDER — LIDOCAINE HYDROCHLORIDE 10 MG/ML
0.1 INJECTION, SOLUTION EPIDURAL; INFILTRATION; INTRACAUDAL; PERINEURAL AS NEEDED
Status: CANCELLED | OUTPATIENT
Start: 2020-01-07

## 2020-01-07 RX ORDER — MIDAZOLAM HYDROCHLORIDE 1 MG/ML
INJECTION, SOLUTION INTRAMUSCULAR; INTRAVENOUS AS NEEDED
Status: DISCONTINUED | OUTPATIENT
Start: 2020-01-07 | End: 2020-01-07 | Stop reason: HOSPADM

## 2020-01-07 RX ORDER — SODIUM CHLORIDE 0.9 % (FLUSH) 0.9 %
5-40 SYRINGE (ML) INJECTION AS NEEDED
Status: CANCELLED | OUTPATIENT
Start: 2020-01-07

## 2020-01-07 RX ORDER — ONDANSETRON 2 MG/ML
4 INJECTION INTRAMUSCULAR; INTRAVENOUS AS NEEDED
Status: DISCONTINUED | OUTPATIENT
Start: 2020-01-07 | End: 2020-01-07 | Stop reason: HOSPADM

## 2020-01-07 RX ORDER — MORPHINE SULFATE 2 MG/ML
2 INJECTION, SOLUTION INTRAMUSCULAR; INTRAVENOUS
Status: DISCONTINUED | OUTPATIENT
Start: 2020-01-07 | End: 2020-01-08 | Stop reason: HOSPADM

## 2020-01-07 RX ORDER — FENTANYL CITRATE 50 UG/ML
50 INJECTION, SOLUTION INTRAMUSCULAR; INTRAVENOUS AS NEEDED
Status: CANCELLED | OUTPATIENT
Start: 2020-01-07

## 2020-01-07 RX ORDER — SODIUM CHLORIDE 0.9 % (FLUSH) 0.9 %
5-40 SYRINGE (ML) INJECTION AS NEEDED
Status: DISCONTINUED | OUTPATIENT
Start: 2020-01-07 | End: 2020-01-07 | Stop reason: HOSPADM

## 2020-01-07 RX ADMIN — Medication 10 ML: at 14:50

## 2020-01-07 RX ADMIN — GLYCOPYRROLATE 0.2 MG: 0.2 INJECTION, SOLUTION INTRAMUSCULAR; INTRAVENOUS at 14:48

## 2020-01-07 RX ADMIN — FAMOTIDINE 20 MG: 10 INJECTION, SOLUTION INTRAVENOUS at 17:04

## 2020-01-07 RX ADMIN — VANCOMYCIN HYDROCHLORIDE 1250 MG: 10 INJECTION, POWDER, LYOPHILIZED, FOR SOLUTION INTRAVENOUS at 10:08

## 2020-01-07 RX ADMIN — FENTANYL CITRATE 25 MCG: 50 INJECTION, SOLUTION INTRAMUSCULAR; INTRAVENOUS at 14:37

## 2020-01-07 RX ADMIN — LIDOCAINE HYDROCHLORIDE 100 MG: 20 INJECTION, SOLUTION EPIDURAL; INFILTRATION; INTRACAUDAL; PERINEURAL at 13:19

## 2020-01-07 RX ADMIN — DEXAMETHASONE SODIUM PHOSPHATE 10 MG: 10 INJECTION, SOLUTION INTRAMUSCULAR; INTRAVENOUS at 13:32

## 2020-01-07 RX ADMIN — DEXAMETHASONE SODIUM PHOSPHATE 10 MG: 10 INJECTION, SOLUTION INTRAMUSCULAR; INTRAVENOUS at 08:13

## 2020-01-07 RX ADMIN — VANCOMYCIN HYDROCHLORIDE 1250 MG: 10 INJECTION, POWDER, LYOPHILIZED, FOR SOLUTION INTRAVENOUS at 17:05

## 2020-01-07 RX ADMIN — ONDANSETRON 4 MG: 2 INJECTION INTRAMUSCULAR; INTRAVENOUS at 17:05

## 2020-01-07 RX ADMIN — FENTANYL CITRATE 25 MCG: 50 INJECTION, SOLUTION INTRAMUSCULAR; INTRAVENOUS at 14:25

## 2020-01-07 RX ADMIN — GLYCOPYRROLATE 0.2 MG: 0.2 INJECTION INTRAMUSCULAR; INTRAVENOUS at 14:48

## 2020-01-07 RX ADMIN — Medication 10 ML: at 22:39

## 2020-01-07 RX ADMIN — PIPERACILLIN SODIUM AND TAZOBACTAM SODIUM 3.38 G: 3; .375 INJECTION, POWDER, LYOPHILIZED, FOR SOLUTION INTRAVENOUS at 02:29

## 2020-01-07 RX ADMIN — PIPERACILLIN SODIUM AND TAZOBACTAM SODIUM 3.38 G: 3; .375 INJECTION, POWDER, LYOPHILIZED, FOR SOLUTION INTRAVENOUS at 17:05

## 2020-01-07 RX ADMIN — GLYCOPYRROLATE 0.6 MG: 0.2 INJECTION, SOLUTION INTRAMUSCULAR; INTRAVENOUS at 13:42

## 2020-01-07 RX ADMIN — FENTANYL CITRATE 25 MCG: 50 INJECTION, SOLUTION INTRAMUSCULAR; INTRAVENOUS at 14:20

## 2020-01-07 RX ADMIN — FENTANYL CITRATE 100 MCG: 50 INJECTION, SOLUTION INTRAMUSCULAR; INTRAVENOUS at 13:19

## 2020-01-07 RX ADMIN — MIDAZOLAM 2 MG: 1 INJECTION INTRAMUSCULAR; INTRAVENOUS at 13:09

## 2020-01-07 RX ADMIN — ROCURONIUM BROMIDE 10 MG: 10 SOLUTION INTRAVENOUS at 13:19

## 2020-01-07 RX ADMIN — SUCCINYLCHOLINE CHLORIDE 160 MG: 20 INJECTION, SOLUTION INTRAMUSCULAR; INTRAVENOUS at 13:19

## 2020-01-07 RX ADMIN — SODIUM CHLORIDE, SODIUM LACTATE, POTASSIUM CHLORIDE, AND CALCIUM CHLORIDE 100 ML/HR: 600; 310; 30; 20 INJECTION, SOLUTION INTRAVENOUS at 02:21

## 2020-01-07 RX ADMIN — VANCOMYCIN HYDROCHLORIDE 2000 MG: 10 INJECTION, POWDER, LYOPHILIZED, FOR SOLUTION INTRAVENOUS at 01:18

## 2020-01-07 RX ADMIN — ONDANSETRON HYDROCHLORIDE 4 MG: 2 INJECTION, SOLUTION INTRAMUSCULAR; INTRAVENOUS at 13:47

## 2020-01-07 RX ADMIN — FENTANYL CITRATE 25 MCG: 50 INJECTION, SOLUTION INTRAMUSCULAR; INTRAVENOUS at 14:15

## 2020-01-07 RX ADMIN — FAMOTIDINE 20 MG: 10 INJECTION, SOLUTION INTRAVENOUS at 02:22

## 2020-01-07 RX ADMIN — SODIUM CHLORIDE, SODIUM LACTATE, POTASSIUM CHLORIDE, AND CALCIUM CHLORIDE 100 ML/HR: 600; 310; 30; 20 INJECTION, SOLUTION INTRAVENOUS at 17:05

## 2020-01-07 RX ADMIN — NEOSTIGMINE METHYLSULFATE 3 MG: 1 INJECTION, SOLUTION INTRAMUSCULAR; INTRAVENOUS; SUBCUTANEOUS at 13:42

## 2020-01-07 RX ADMIN — Medication 10 ML: at 06:34

## 2020-01-07 RX ADMIN — DEXAMETHASONE SODIUM PHOSPHATE 10 MG: 10 INJECTION, SOLUTION INTRAMUSCULAR; INTRAVENOUS at 22:43

## 2020-01-07 RX ADMIN — PROPOFOL 200 MG: 10 INJECTION, EMULSION INTRAVENOUS at 13:19

## 2020-01-07 RX ADMIN — PIPERACILLIN SODIUM AND TAZOBACTAM SODIUM 3.38 G: 3; .375 INJECTION, POWDER, LYOPHILIZED, FOR SOLUTION INTRAVENOUS at 10:08

## 2020-01-07 NOTE — H&P
Dr. Alicia Rios,    Thank you for involving me in this patient's care. Please see below for my assessment and feel free to contact me directly with any questions. -BF    OTOLARYNGOLOGY - HEAD AND NECK SURGERY HISTORY AND PHYSICAL    Requesting Physician:  Heather Madison MD       CC:   Tongue swelling    HPI:     Mary Cartagena is a 53 y.o. male seen today in consultation at the request of Dr. Alicia Rios for tongue swelling. Patient presents with family. C/o throat pain and swelling with dysphagia that started 12/29. In ER 1/3/2020 with headache and dysphagia but treated only with NSAIDS. Swelling worsened. No fevers. CT today showed an abscess in the right FOM. Reports feeling better after zosyn and decadron at [facility] prior to transfer here. No past medical history on file. No past surgical history on file. No current facility-administered medications for this encounter. Current Outpatient Medications   Medication Sig    amoxicillin (AMOXIL) 500 mg capsule Take 1 Cap by mouth two (2) times a day for 10 days.  naproxen (NAPROSYN) 500 mg tablet Take 1 Tab by mouth two (2) times daily (with meals).  ondansetron (ZOFRAN ODT) 8 mg disintegrating tablet Take 1 Tab by mouth every eight (8) hours as needed for Nausea. No Known Allergies  No family history on file. Social History     Tobacco Use    Smoking status: Never Smoker    Smokeless tobacco: Never Used   Substance Use Topics    Alcohol use: Not Currently     Frequency: Never    Drug use: Never         REVIEW OF SYSTEMS  A full 10 point review of systems was performed today. The review was non-pertinent other than the details already listed in the history of present illness. Visit Vitals  /75 (BP 1 Location: Right arm, BP Patient Position: At rest)   Pulse 85   Temp 99.6 °F (37.6 °C)   Resp 17   SpO2 98%        PHYSICAL EXAM  General:  No acute distress. Alert and oriented x 3.    MSK:   Normal muscle bulk  Psych:  Mood and affect appropriate. Neuro:   CN II - XII grossly intact bilaterally. Eyes:  PERRL/EOMI, no nystagmus. ENT:   Watery edema FOM. Ventral tongue is firm. Poor dentition but no obviously abscessed teeth. 3cm trismus. Lymph:  Neck soft and supple with reactive lymphadenopathy. Resp:   No audible stridor or wheezing. Skin:   Head and neck skin is without suspicious lesions. PROCEDURE:    Flexible Nasolaryngoscopy  Indication: sublingual abscess  Findings: No mass in NP. Fossae of Rosenmüller are free of tumor. Bilateral eustachian tube orifices are well defined with no overlying mass. Base of tongue symmetric. Epiglottis is crisp. No masses in pyriform sinuses. AE folds are symmetric without mucosal swelling. Vocal cords move symmetrically. A timeout was performed in which the patient's name and procedure were verified. A flexible laryngoscope was then inserted into the right nostril and advanced posteriorly to the nasopharynx. Findings noted above. DATA REVIEW:  CT neck dated 1/6/2020: I personally reviewed the scan which has the following pertinent findings:  4cm x 2cm fluid collection within the right sublingual space. Airway is patent without edema on larynx. Lab Results   Component Value Date/Time    WBC 17.1 (H) 01/06/2020 07:35 PM    HGB 15.6 01/06/2020 07:35 PM    HCT 45.3 01/06/2020 07:35 PM    PLATELET 355 01/82/5260 07:35 PM    MCV 85.2 01/06/2020 07:35 PM        ASSESSMENT/PLAN:  52 yo M with right FOM abscess. Airway is safe and he is currently not toxic. Will likely need I&D tomorrow but would prefer to allow edema and trismus to improve to make intubation easier and decrease chances of needing a tracheostomy. Continue Decadron 10mg Q8H along with zosyn and add Vancomycin. Keep NPO. Please admit to hospitalist service. Will assess again in the morning but please call if worsening status. Joseph Martin, [address] [facility]  [address] [facility]   [address], [address] [facility], [address] [facility]   V) [phone] (Z) [phone]

## 2020-01-07 NOTE — ED PROVIDER NOTES
53 y.o. male with no past medical history who presents via EMS from [facility] to the ED with chief complaint of dental pain. Patient was seen at [facility] today for pain under his tongue for the past 8-9 days. A CT at [facility] showed that patient had an abscess to the floor of his mouth. He arrives to [facility] ED with continued pain and associated fever and chills. Patient reports that he has difficulty with swallowing, opening his mouth and with PO intake secondary to pain. He states that he was seen at his PCP office today and tested positive for strep. Patient denies any shortness of breath. Of note, patient is Palauan speaking and history was obtained with translation by son. There are no other acute medical concerns at this time. Social Hx: no Tobacco use, no EtOH use, no Illicit Drug use  PCP: Keagan Lima MD    Note written by Ruth Boss, as dictated by Ildefonso Moffett MD 10:13 PM      The history is provided by the patient. The history is limited by a language barrier. No  was used. No past medical history on file. No past surgical history on file. No family history on file.     Social History     Socioeconomic History    Marital status:      Spouse name: Not on file    Number of children: Not on file    Years of education: Not on file    Highest education level: Not on file   Occupational History    Not on file   Social Needs    Financial resource strain: Not on file    Food insecurity:     Worry: Not on file     Inability: Not on file    Transportation needs:     Medical: Not on file     Non-medical: Not on file   Tobacco Use    Smoking status: Never Smoker    Smokeless tobacco: Never Used   Substance and Sexual Activity    Alcohol use: Not Currently     Frequency: Never    Drug use: Never    Sexual activity: Not on file   Lifestyle    Physical activity:     Days per week: Not on file     Minutes per session: Not on file    Stress: Not on file Relationships    Social connections:     Talks on phone: Not on file     Gets together: Not on file     Attends Sikh service: Not on file     Active member of club or organization: Not on file     Attends meetings of clubs or organizations: Not on file     Relationship status: Not on file    Intimate partner violence:     Fear of current or ex partner: Not on file     Emotionally abused: Not on file     Physically abused: Not on file     Forced sexual activity: Not on file   Other Topics Concern    Not on file   Social History Narrative    Not on file         ALLERGIES: Patient has no known allergies. Review of Systems   Constitutional: Positive for appetite change, chills and fever. HENT: Positive for dental problem and trouble swallowing. Negative for rhinorrhea and sore throat. Eyes: Negative for photophobia. Respiratory: Negative for cough and shortness of breath. Cardiovascular: Negative for chest pain and palpitations. Gastrointestinal: Negative for abdominal pain, nausea and vomiting. Genitourinary: Negative for dysuria, frequency and hematuria. Musculoskeletal: Negative for arthralgias. Neurological: Negative for dizziness, syncope and weakness. Psychiatric/Behavioral: Negative for behavioral problems. The patient is not nervous/anxious. Vitals:    01/06/20 2127   BP: 147/75   Pulse: 85   Resp: 17   Temp: 99.6 °F (37.6 °C)   SpO2: 98%            Physical Exam  Vitals signs and nursing note reviewed. Constitutional:       Appearance: He is well-developed. HENT:      Head: Normocephalic and atraumatic. Mouth/Throat:      Pharynx: Oropharynx is clear. Comments: Pain/difficulty with opening mouth, mild swelling under tongue  Eyes:      Pupils: Pupils are equal, round, and reactive to light. Neck:      Musculoskeletal: Normal range of motion and neck supple. Cardiovascular:      Rate and Rhythm: Normal rate and regular rhythm.       Heart sounds: Normal heart sounds. No murmur. No friction rub. No gallop. Pulmonary:      Effort: Pulmonary effort is normal. No respiratory distress. Breath sounds: No wheezing or rales. Abdominal:      Palpations: Abdomen is soft. Tenderness: There is no tenderness. There is no rebound. Musculoskeletal: Normal range of motion. General: No tenderness. Skin:     Findings: No erythema. Neurological:      Mental Status: He is alert. Cranial Nerves: No cranial nerve deficit. Comments: Motor; symmetric   Psychiatric:         Behavior: Behavior normal.     Note written by Ruth Cole, as dictated by Maddy Mary MD 10:13 PM    [facility]       Procedures          Hospitalist Martir Text for Admission  10:54 PM    ED Room Number: ER10/10  Patient Name and age:  Mary Cartagena 53 y.o.  male  Working Diagnosis:   1.  Nasir's angina      Readmission: no  Isolation Requirements:  no  Recommended Level of Care:  med/surg  Code Status:  FULL  Other:  Dr Magnolia Venegas otolaryngology has seen the patient and recommends Decadron every 8 hours Zosyn and vancomycin which he will receive or has already received in the ER  Department:[facility] Adult ED - [phone]

## 2020-01-07 NOTE — ED NOTES
Son at bedside, son able to translate if needed  Pt undressed, informed of NPO status  Awaiting transport at this time

## 2020-01-07 NOTE — BRIEF OP NOTE
BRIEF OPERATIVE NOTE    Date of Procedure: 1/7/2020   Preoperative Diagnosis: mouth abscess - right  Postoperative Diagnosis: mouth abscess - right    Procedure(s):  ORAL MASS INCISION AND DRAINAGE  Surgeon(s) and Role:     * Britta May MD - Primary         Surgical Assistant: none    Surgical Staff:  Circ-1: Esther Cota RN  Scrub Tech-1: Donna Whipple Time In Time Out   Incision Start 01/07/2020 1327    Incision Close 01/07/2020 1341      Anesthesia: General   Estimated Blood Loss: 5mL  Specimens:   ID Type Source Tests Collected by Time Destination   1 : Floor of mouth abscess Body Fluid Mouth CULTURE, AEROBIC AND ANAEROBIC Britta May MD 1/7/2020 1332 Microbiology      Findings: 8mL pus aspirated   Complications: none  Implants: * No implants in log *

## 2020-01-07 NOTE — PERIOP NOTES
TRANSFER - OUT REPORT:    Verbal report given to ,RN(name) on Mary Leaks  being transferred to 2N(unit) for routine post - op       Report consisted of patients Situation, Background, Assessment and   Recommendations(SBAR). Time Pre op antibiotic given:as scheduled  Anesthesia Stop time: 3428  Reveles Present on Transfer to floor:no  Order for Reveles on Chart:no  Discharge Prescriptions with Chart:no    Information from the following report(s) SBAR, Kardex, OR Summary, Procedure Summary, Intake/Output, MAR, Accordion, Recent Results and Cardiac Rhythm NSR was reviewed with the receiving nurse. Opportunity for questions and clarification was provided. Is the patient on 02? YES       L/Min 3      Is the patient on a monitor? NO    Is the nurse transporting with the patient? NO    Surgical Waiting Area notified of patient's transfer from PACU?  YES      The following personal items collected during your admission accompanied patient upon transfer:   Dental Appliance: Dental Appliances: None  Vision:    Hearing Aid:    Jewelry:    Clothing:    Other Valuables:    Valuables sent to safe:

## 2020-01-07 NOTE — ANESTHESIA PREPROCEDURE EVALUATION
Relevant Problems   No relevant active problems       Anesthetic History   No history of anesthetic complications            Review of Systems / Medical History  Patient summary reviewed, nursing notes reviewed and pertinent labs reviewed    Pulmonary  Within defined limits                 Neuro/Psych   Within defined limits           Cardiovascular  Within defined limits                     GI/Hepatic/Renal  Within defined limits              Endo/Other  Within defined limits      Obesity     Other Findings              Physical Exam    Airway  Mallampati: II  TM Distance: > 6 cm  Neck ROM: normal range of motion   Mouth opening: Normal     Cardiovascular  Regular rate and rhythm,  S1 and S2 normal,  no murmur, click, rub, or gallop             Dental  No notable dental hx       Pulmonary  Breath sounds clear to auscultation               Abdominal  GI exam deferred       Other Findings            Anesthetic Plan    ASA: 2  Anesthesia type: general          Induction: Intravenous  Anesthetic plan and risks discussed with: Patient

## 2020-01-07 NOTE — ROUTINE PROCESS
Patient: Elena Mohan MRN: [id number]  SSN: [id number]   YOB: 1975  Age: 53 y.o. Sex: male     Patient is status post Procedure(s):  ORAL MASS INCISION AND DRAINAGE.     Surgeon(s) and Role:     * Sammy Rose MD - Primary    Local/Dose/Irrigation:  See [facility]                          Airway - Endotracheal Tube 01/07/20 Nare, right (Active)                   Dressing/Packing:       Splint/Cast:  ]    Other:  N/A

## 2020-01-07 NOTE — ED PROVIDER NOTES
History is provided by the patient. His son is here and helps with translation. This patient has had pain underneath his tongue since December 29, a little over a week ago. It has gotten worse. He had some subjective fever and chills today. No vomiting. It is hard for him to eat and drink anything because of the pain. He also is having problems opening his mouth fully. He recently establish care with primary care at the [facility]. Today, he was in the office and was evaluated. It sounds like they thought that he had a sore throat. A strep screen was positive. A CT was performed which showed an abscess in the floor of the mouth. I reviewed the CT images and the results of the CT. The fluid collection is multi-loculated and it is 4 x 2.2 cm. No history of diabetes. No history of oral surgery. Sore Throat           No past medical history on file. No past surgical history on file. No family history on file.     Social History     Socioeconomic History    Marital status:      Spouse name: Not on file    Number of children: Not on file    Years of education: Not on file    Highest education level: Not on file   Occupational History    Not on file   Social Needs    Financial resource strain: Not on file    Food insecurity:     Worry: Not on file     Inability: Not on file    Transportation needs:     Medical: Not on file     Non-medical: Not on file   Tobacco Use    Smoking status: Never Smoker    Smokeless tobacco: Never Used   Substance and Sexual Activity    Alcohol use: Not Currently     Frequency: Never    Drug use: Never    Sexual activity: Not on file   Lifestyle    Physical activity:     Days per week: Not on file     Minutes per session: Not on file    Stress: Not on file   Relationships    Social connections:     Talks on phone: Not on file     Gets together: Not on file     Attends Christian service: Not on file     Active member of club or organization: Not on file     Attends meetings of clubs or organizations: Not on file     Relationship status: Not on file    Intimate partner violence:     Fear of current or ex partner: Not on file     Emotionally abused: Not on file     Physically abused: Not on file     Forced sexual activity: Not on file   Other Topics Concern    Not on file   Social History Narrative    Not on file         ALLERGIES: Patient has no known allergies. Review of Systems   HENT: Positive for sore throat. All other systems reviewed and are negative. Vitals:    01/06/20 1907   BP: 142/76   Pulse: 81   Resp: 18   Temp: 99.3 °F (37.4 °C)   SpO2: 96%   Weight: 98 kg (216 lb)   Height: 6' 2\" (1.88 m)            Physical Exam  Constitutional:       Appearance: He is well-developed. HENT:      Head: Normocephalic. Mouth/Throat:      Mouth: Mucous membranes are moist.      Pharynx: Uvula midline. Comments: He has moderate trismus. Uvula is midline. I think that the posterior oropharyngeal exam is fairly normal.  He is unable to stick out his tongue. He is tender in the floor of his mouth. Neck:      Musculoskeletal: Normal range of motion. Cardiovascular:      Rate and Rhythm: Normal rate. Pulses: Normal pulses. Pulmonary:      Effort: Pulmonary effort is normal.      Breath sounds: Normal breath sounds. Abdominal:      Palpations: Abdomen is soft. Tenderness: There is no tenderness. Musculoskeletal:      Right lower leg: No edema. Left lower leg: No edema. Lymphadenopathy:      Cervical: Cervical adenopathy (bilateral anterior cervical PRINCE) present. Skin:     General: Skin is warm and dry. Capillary Refill: Capillary refill takes less than 2 seconds. Neurological:      General: No focal deficit present. Mental Status: He is alert.    Psychiatric:         Mood and Affect: Mood normal.          MDM  Number of Diagnoses or Management Options  Oral abscess:   Critical Care  Total time providing critical care: 30-74 minutes       40 minutes      Procedures      I reviewed CT images and the results today. I have ordered some baseline blood work, as well as blood cultures. He is afebrile here. We will give him some IV fluids, make him n.p.o., IV Decadron and give him some Zosyn. This is very likely an odontogenic process. I do not see any gingival fluctuance. 7:52 PM - d/w Dr Jie Gomez - on call ENT at [facility] - plan is send to ED now. Steroids/abx. He will see him in ED and decide whether to take to OR tonight or tomorrow. No ENT coverage at [facility] [place] today    Gave report to EMS - 8:46 PM    Recent Results (from the past 12 hour(s))   AMB POC RAPID STREP A    Collection Time: 01/06/20 11:26 AM   Result Value Ref Range    VALID INTERNAL CONTROL POC Yes     Group A Strep Ag Positive Negative   POC LACTIC ACID    Collection Time: 01/06/20  7:32 PM   Result Value Ref Range    Lactic Acid (POC) 0.78 0.40 - 2.00 mmol/L   CBC WITH AUTOMATED DIFF    Collection Time: 01/06/20  7:35 PM   Result Value Ref Range    WBC 17.1 (H) 4.1 - 11.1 K/uL    RBC 5.32 4.10 - 5.70 M/uL    HGB 15.6 12.1 - 17.0 g/dL    HCT 45.3 36.6 - 50.3 %    MCV 85.2 80.0 - 99.0 FL    MCH 29.3 26.0 - 34.0 PG    MCHC 34.4 30.0 - 36.5 g/dL    RDW 12.7 11.5 - 14.5 %    PLATELET 094 412 - 256 K/uL    MPV 11.8 8.9 - 12.9 FL    NRBC 0.0 0  WBC    ABSOLUTE NRBC 0.00 0.00 - 0.01 K/uL    NEUTROPHILS 87 (H) 32 - 75 %    LYMPHOCYTES 6 (L) 12 - 49 %    MONOCYTES 7 5 - 13 %    EOSINOPHILS 0 0 - 7 %    BASOPHILS 0 0 - 1 %    IMMATURE GRANULOCYTES 0 0.0 - 0.5 %    ABS. NEUTROPHILS 14.8 (H) 1.8 - 8.0 K/UL    ABS. LYMPHOCYTES 1.0 0.8 - 3.5 K/UL    ABS. MONOCYTES 1.2 (H) 0.0 - 1.0 K/UL    ABS. EOSINOPHILS 0.0 0.0 - 0.4 K/UL    ABS. BASOPHILS 0.0 0.0 - 0.1 K/UL    ABS. IMM.  GRANS. 0.1 (H) 0.00 - 0.04 K/UL    DF AUTOMATED     METABOLIC PANEL, BASIC    Collection Time: 01/06/20  7:35 PM   Result Value Ref Range    Sodium 134 (L) 136 - 145 mmol/L    Potassium 3.5 3.5 - 5.1 mmol/L    Chloride 102 97 - 108 mmol/L    CO2 25 21 - 32 mmol/L    Anion gap 7 5 - 15 mmol/L    Glucose 114 (H) 65 - 100 mg/dL    BUN 17 6 - 20 MG/DL    Creatinine 0.74 0.70 - 1.30 MG/DL    BUN/Creatinine ratio 23 (H) 12 - 20      GFR est AA >60 >60 ml/min/1.73m2    GFR est non-AA >60 >60 ml/min/1.73m2    Calcium 9.3 8.5 - 10.1 MG/DL   SAMPLES BEING HELD    Collection Time: 01/06/20  7:35 PM   Result Value Ref Range    SAMPLES BEING HELD SST. GINO     COMMENT        Add-on orders for these samples will be processed based on acceptable specimen integrity and analyte stability, which may vary by analyte.    PROTHROMBIN TIME + INR    Collection Time: 01/06/20  7:35 PM   Result Value Ref Range    INR 1.1 0.9 - 1.1      Prothrombin time 11.4 (H) 9.0 - 11.1 sec

## 2020-01-07 NOTE — PROGRESS NOTES
Pharmacist Note - Vancomycin Dosing    Consult provided for this 53 y.o. male for indication of oral/throat abscess. Antibiotic regimen(s): vancomycin and zosyn  Patient on vancomycin PTA? NO     Recent Labs     20  1935   WBC 17.1*   CREA 0.74   BUN 17     Frequency of BMP: daily  Height: 188 cm  Weight: 98 kg  Est CrCl: >120 ml/min  Temp (24hrs), Av.3 °F (37.4 °C), Min:98.3 °F (36.8 °C), Max:100.2 °F (37.9 °C)    Cultures:   blood    Goal trough = 10 - 15 mcg/mL    Therapy will be initiated with a loading dose of 2000 mg IV x 1 to be followed by a maintenance dose of 1250 mg IV every 8 hours. Pharmacy to follow patient daily and order levels / make dose adjustments as appropriate.

## 2020-01-07 NOTE — ED NOTES
D/w Dr Shakir Miranda - accepts pt to [facility] over transfer and results with pt and wife via [place] .

## 2020-01-07 NOTE — PROGRESS NOTES
53 y.o. gentleman admitted for IV antibiotics and steroids overnight to improve edema and airway prior to planned I&D. Reports feeling better with improved swelling and less pain. Visit Vitals  /80 (BP 1 Location: Right arm, BP Patient Position: At rest)   Pulse 72   Temp 98 °F (36.7 °C)   Resp 18   Wt 99.3 kg (218 lb 14.4 oz)   SpO2 94%   BMI 28.11 kg/m²    NAD  Right ventral tongue and floor of mouth with fullness. Improved edema. Able to see posterior pharyngeal wall with palpation of the tongue. Lab Results   Component Value Date/Time    WBC 16.9 (H) 01/07/2020 02:39 AM    HGB 15.3 01/07/2020 02:39 AM    HCT 45.9 01/07/2020 02:39 AM    PLATELET 247 02/05/3603 02:39 AM    MCV 86.9 01/07/2020 02:39 AM      A/P:  Continue with decadron, zosyn and vancomycin for sublingual abscess likely odontogenic in origin. Through a  I explained the need for incision and drainage of the abscess with a plan for nasotracheal intubation. He understands the possible requirement for tracheostomy as well. We reviewed the risks of surgery including need for additional surgery, bleeding, infection, injury to salivary glands, nerves serving the tongue, vessels serving the tongue, recurrence, the need for outpatient dentistry follow up, and the general risks of anesthesia including death. Written consent obtained for incision and drainage of oral abscess with possible tracheostomy. Will plan to proceed with procedure as discussed. Informed written consent obtained through  with nurse witness. I anticipate surgery time around noon. Please maintain NPO status. Hold on NSAIDS to prevent hemorrhage. Adiel Robertson MD

## 2020-01-07 NOTE — PROGRESS NOTES
CROSS CULTURAL SERVICES    Rounded on pt and assessed for language and cultural needs.     Carlee Longoria

## 2020-01-07 NOTE — H&P
[address]  HISTORY AND PHYSICAL    Name:  Vincenzo Burns  MR#:  [id number]  :  1975  ACCOUNT #:  [de-identified]  ADMIT DATE:  2020    The patient was seen, evaluated and admitted by me on 2020. PRIMARY CARE PHYSICIAN:  Terrance Gonzalez MD    SOURCE OF INFORMATION:  The patient with the patient's son as the  because the patient is Hebrew-speaking only. CHIEF COMPLAINT:  Sore throat. HISTORY OF PRESENT ILLNESS:  This is a 47-year-old man with no significant past medical history, was in his usual state of health until about a week ago when the patient developed sore throat. The patient described the sore throat as a swelling and pain in his throat with difficulty with swallowing, was seen at the emergency room a couple of days later. The patient was treated with nonsteroidal anti-inflammatory drugs with no significant improvement. The patient subsequently went to the emergency room at [facility] where CT scan of the neck was obtained and shows abscess collection right floor of the mouth. The emergency room physician consulted ENT surgeon who advised that the patient be transferred to [facility] for him to do consultation on the patient. The patient was then transferred to [facility] Emergency Room. The patient was seen by the ENT surgeon in the emergency room and recommended admission to the hospitalist service as well as starting the patient on vancomycin, Zosyn and Decadron. The patient was referred to the hospitalist service for that purpose. No record of prior admission to this hospital.    PAST MEDICAL HISTORY:  Not significant. ALLERGIES:  NO KNOWN DRUG ALLERGIES. MEDICATIONS:  1. Amoxicillin 500 mg twice daily started yesterday. 2.  Naprosyn 500 mg twice daily started a few days ago. 3.  Zofran 8 mg every 8 hours as needed for nausea also started a few days ago.     FAMILY HISTORY:  This was reviewed. His mother has diabetes. PAST SURGICAL HISTORY:  This is significant for appendectomy. SOCIAL HISTORY:  The patient is a former smoker. No history of alcohol or illicit drug abuse. REVIEW OF SYSTEMS:  HEAD, EYES, EARS, NOSE AND THROAT:  This is positive for difficulty with swallowing. No headache, no blurring of vision, no photophobia. RESPIRATORY SYSTEM:  No cough, no shortness of breath, no hemoptysis. CARDIOVASCULAR SYSTEM:  No chest pain, no orthopnea, no palpitation. GASTROINTESTINAL SYSTEM:  No nausea or vomiting. No diarrhea. No constipation. GENITOURINARY SYSTEM:  No dysuria, no urgency and no frequency. All other systems are reviewed and they are negative. PHYSICAL EXAMINATION  GENERAL APPEARANCE:  The patient appeared ill in moderate distress. VITAL SIGNS:  On arrival at the emergency room, temperature 99.6, pulse 85, respiratory rate 17, blood pressure 147/75, oxygen saturation 98% on room air. HEAD:  Normocephalic, atraumatic. EYES:  Normal eye movement. No redness, no drainage, no discharge. EARS:  Normal external ears with no evidence of drainage. NOSE:  No deformity, no drainage. MOUTH AND THROAT:  Significant pain on opening the mouth. Dry oral mucosa. NECK:  Neck is supple. No JVD, no thyromegaly. CHEST:  Clear breath sounds. No wheezing, no crackles. HEART:  Normal S1 and S2, regular. No clinically appreciable murmur. ABDOMEN:  Soft, nontender, normal bowel sounds. CNS:  Alert, oriented x3. No gross focal neurological deficit. EXTREMITIES:  No edema. Pulses 2+ bilaterally. MUSCULOSKELETAL SYSTEM:  No obvious joint deformity and swelling. SKIN:  No active skin lesions seen in the exposed parts of the body. PSYCHIATRY:  Normal mood and affect. LYMPHATIC SYSTEM:  Bilateral cervical lymphadenopathy noted.     DIAGNOSTIC DATA:  CT scan of the soft tissue of the neck shows fluid collection in the right floor of the mouth compatible with abscess. LABORATORY DATA:  Point-of-care lactic acid level 0.78. Hematology:  WBC 17.1, hemoglobin at 15.6, hematocrit 45.3, platelets 327. Coagulation profile, INR 1.1, PT 11.4. Chemistry:  Sodium 134, potassium 3.5, chloride 102, CO2 25, glucose 114, BUN 17, creatinine 0.74. ASSESSMENT:  1. Abscess of oral space. 2.  Hyperglycemia. 3.  Elevated blood pressure. PLAN:  1. Abscess of oral space. We will admit the patient for further evaluation and treatment. We will start the patient on vancomycin, Zosyn and Decadron as advised by the ENT surgeon. The patient will be n.p.o. in anticipation of surgical intervention. We will carry out other supportive therapy which includes pain control, fluid therapy. The patient is able to maintain his airway. We will await further recommendation from the ENT surgeon. 2.  Hyperglycemia. We will check hemoglobin A1c level. The patient has no history of diabetes. 3.  Elevated blood pressure. We will continue to monitor the patient's blood pressure closely. We will check a TSH level. If the average blood pressure reading remains elevated, the patient may require the antihypertensive medication at the time of discharge home for new-onset essential hypertension. We will check chest x-ray. We will check urine drug screen. OTHER ISSUES:  Code status, the patient is a full code, and we will request SCD for DVT prophylaxis. The patient will require prophylaxis with heparin or Lovenox after surgical intervention for the abscess of oral space. FUNCTIONAL STATUS PRIOR TO ADMISSION:  The patient came from home and the patient is ambulatory with no assistant or device.       Franchesca Wong MD      [id number]  D:  01/07/2020 4:18  T:  01/07/2020 5:59  JOB #:  [id number]  CC:  Amy Campos MD

## 2020-01-07 NOTE — ED NOTES
2131- Patient arrives from another facility for sublingual abscess. Fever down to 99.6 ax. Received Zosyn, Zofran, Decadron, Morphine and IVF. Lactic negative, Pair BC sent off. VSS.     0136- TRANSFER - OUT REPORT:    Verbal report given to RN(name) on Asad Pinedo  being transferred to (unit) for routine progression of care       Report consisted of patients Situation, Background, Assessment and   Recommendations(SBAR). Information from the following report(s) ED Summary was reviewed with the receiving nurse. Lines:       Opportunity for questions and clarification was provided.       Patient transported with:   Monitor  Tech

## 2020-01-07 NOTE — PROGRESS NOTES
[address] [facility] Adult  Hospitalist Group                                                                                          Hospitalist Progress Note  Lele Barron MD  Answering service: [phone] from in house phone        Date of Service:  2020  NAME:  Luis Jaime  :  1975  MRN:  [id number]      Admission Summary: This is a 78-year-old man with no significant past medical history, was in his usual state of health until about a week ago when the patient developed sore throat. The patient described the sore throat as a swelling and pain in his throat with difficulty with swallowing, was seen at the emergency room a couple of days later. The patient was treated with nonsteroidal anti-inflammatory drugs with no significant improvement. The patient subsequently went to the emergency room at [place] [address] where CT scan of the neck was obtained and shows abscess collection right floor of the mouth. The emergency room physician consulted ENT surgeon who advised that the patient be transferred to [facility] for him to do consultation on the patient. The patient was then transferred to [facility] Emergency Room. The patient was seen by the ENT surgeon in the emergency room and recommended admission to the hospitalist service as well as starting the patient on vancomycin, Zosyn and Decadron. The patient was referred to the hospitalist service for that purpose. No record of prior admission to this hospital.    Interval history / Subjective:       Patient is status post I&D of the abscess of the floor of the mouth. Rating clear liquids. No significant pain. Assessment & Plan:     Mouth abscess  Status post incision and drainage per ENT. Tolerating clear liquid. Continue pain management, IV antibiotics and IV steroid. Elevated blood pressure  Blood pressure is now within normal limit.     Elevated blood sugars  Likely secondary to steroid. Hemoglobin A1c is 5.8. Code status: FULL  DVT prophylaxis: SCDs    Care Plan discussed with: Patient/Family  Disposition: TBD     Hospital Problems  Date Reviewed: 1/7/2020          Codes Class Noted POA    * (Principal) Abscess of oral space ICD-10-CM: K12.2  ICD-9-CM: 528.3  1/7/2020 Yes                Review of Systems:   A comprehensive review of systems was negative except for that written in the HPI. Vital Signs:    Last 24hrs VS reviewed since prior progress note. Most recent are:  Visit Vitals  /70 (BP 1 Location: Left arm, BP Patient Position: At rest)   Pulse 74   Temp 98.2 °F (36.8 °C)   Resp 16   Wt 99.3 kg (218 lb 14.4 oz)   SpO2 93%   BMI 28.11 kg/m²         Intake/Output Summary (Last 24 hours) at 1/7/2020 1755  Last data filed at 1/7/2020 1348  Gross per 24 hour   Intake 350 ml   Output 350 ml   Net 0 ml        Physical Examination:             Constitutional:  No acute distress, cooperative, pleasant    ENT:  Oral mucous moist, oropharynx benign. Resp:  CTA bilaterally. No wheezing/rhonchi/rales. No accessory muscle use   CV:  Regular rhythm, normal rate, no murmurs, gallops, rubs    GI:  Soft, non distended, non tender. normoactive bowel sounds, no hepatosplenomegaly     Musculoskeletal:  No edema, warm, 2+ pulses throughout    Neurologic:  Moves all extremities.   AAOx3, CN II-XII reviewed     Skin:  Good turgor, no rashes or ulcers       Data Review:    Review and/or order of clinical lab test      Labs:     Recent Labs     01/07/20 0239 01/06/20 1935   WBC 16.9* 17.1*   HGB 15.3 15.6   HCT 45.9 45.3    188     Recent Labs     01/07/20 0239 01/06/20 1935    134*   K 3.8 3.5    102   CO2 23 25   BUN 15 17   CREA 0.76 0.74   * 114*   CA 9.2 9.3   MG 1.9  --    PHOS 2.4*  --      Recent Labs     01/07/20 0239   SGOT 15   ALT 32   *   TBILI 1.6*   TP 8.2   ALB 3.1*   GLOB 5.1*     Recent Labs     01/06/20 1935   INR 1.1   PTP 11.4* No results for input(s): FE, TIBC, PSAT, FERR in the last 72 hours. No results found for: FOL, RBCF   No results for input(s): PH, PCO2, PO2 in the last 72 hours. No results for input(s): CPK, CKNDX, TROIQ in the last 72 hours.     No lab exists for component: CPKMB  No results found for: CHOL, CHOLX, CHLST, CHOLV, HDL, HDLP, LDL, LDLC, DLDLP, TGLX, TRIGL, TRIGP, CHHD, CHHDX  No results found for: GLUCPOC  No results found for: COLOR, APPRN, SPGRU, REFSG, MAYURI, PROTU, GLUCU, KETU, BILU, UROU, CHARLES, LEUKU, GLUKE, EPSU, BACTU, WBCU, RBCU, CASTS, UCRY      Medications Reviewed:     Current Facility-Administered Medications   Medication Dose Route Frequency    sodium chloride (NS) flush 5-40 mL  5-40 mL IntraVENous Q8H    sodium chloride (NS) flush 5-40 mL  5-40 mL IntraVENous PRN    lactated Ringers infusion  100 mL/hr IntraVENous CONTINUOUS    morphine injection 2 mg  2 mg IntraVENous Q4H PRN    ondansetron (ZOFRAN) injection 4 mg  4 mg IntraVENous Q4H PRN    bisacodyl (DULCOLAX) suppository 10 mg  10 mg Rectal DAILY PRN    piperacillin-tazobactam (ZOSYN) 3.375 g in 0.9% sodium chloride (MBP/ADV) 100 mL  3.375 g IntraVENous Q8H    Vancomycin Pharmacy to Dose   Other Rx Dosing/Monitoring    vancomycin (VANCOCIN) 1250 mg in  ml infusion  1,250 mg IntraVENous Q8H    famotidine (PF) (PEPCID) 20 mg in 0.9% sodium chloride 10 mL injection  20 mg IntraVENous Q12H    dexamethasone (PF) (DECADRON) injection 10 mg  10 mg IntraVENous Q8H    [START ON 1/8/2020] Vancomycin trough WED 1/8 just prior to 0900 dose   Other ONCE     ______________________________________________________________________  EXPECTED LENGTH OF STAY: - - -  ACTUAL LENGTH OF STAY:          0                 Silvana Blackman MD

## 2020-01-08 VITALS
TEMPERATURE: 97.3 F | DIASTOLIC BLOOD PRESSURE: 67 MMHG | RESPIRATION RATE: 17 BRPM | HEART RATE: 60 BPM | BODY MASS INDEX: 28.65 KG/M2 | OXYGEN SATURATION: 93 % | WEIGHT: 223.11 LBS | SYSTOLIC BLOOD PRESSURE: 106 MMHG

## 2020-01-08 LAB
ANION GAP SERPL CALC-SCNC: 6 MMOL/L (ref 5–15)
BUN SERPL-MCNC: 20 MG/DL (ref 6–20)
BUN/CREAT SERPL: 25 (ref 12–20)
CALCIUM SERPL-MCNC: 9 MG/DL (ref 8.5–10.1)
CHLORIDE SERPL-SCNC: 108 MMOL/L (ref 97–108)
CO2 SERPL-SCNC: 25 MMOL/L (ref 21–32)
CREAT SERPL-MCNC: 0.79 MG/DL (ref 0.7–1.3)
DATE LAST DOSE: NORMAL
GLUCOSE SERPL-MCNC: 183 MG/DL (ref 65–100)
POTASSIUM SERPL-SCNC: 3.9 MMOL/L (ref 3.5–5.1)
REPORTED DOSE,DOSE: NORMAL UNITS
REPORTED DOSE/TIME,TMG: NORMAL
SODIUM SERPL-SCNC: 139 MMOL/L (ref 136–145)
VANCOMYCIN TROUGH SERPL-MCNC: 9.3 UG/ML (ref 5–10)

## 2020-01-08 PROCEDURE — 74011000250 HC RX REV CODE- 250: Performed by: OTOLARYNGOLOGY

## 2020-01-08 PROCEDURE — 80202 ASSAY OF VANCOMYCIN: CPT

## 2020-01-08 PROCEDURE — 80048 BASIC METABOLIC PNL TOTAL CA: CPT

## 2020-01-08 PROCEDURE — 74011000258 HC RX REV CODE- 258: Performed by: OTOLARYNGOLOGY

## 2020-01-08 PROCEDURE — 36415 COLL VENOUS BLD VENIPUNCTURE: CPT

## 2020-01-08 PROCEDURE — 74011250636 HC RX REV CODE- 250/636: Performed by: OTOLARYNGOLOGY

## 2020-01-08 RX ORDER — METHYLPREDNISOLONE 4 MG/1
TABLET ORAL
Qty: 1 DOSE PACK | Refills: 0 | Status: SHIPPED | OUTPATIENT
Start: 2020-01-08

## 2020-01-08 RX ORDER — CLINDAMYCIN HYDROCHLORIDE 300 MG/1
600 CAPSULE ORAL 3 TIMES DAILY
Qty: 42 CAP | Refills: 0 | Status: SHIPPED | OUTPATIENT
Start: 2020-01-08

## 2020-01-08 RX ORDER — VANCOMYCIN/0.9 % SOD CHLORIDE 1.5G/250ML
1500 PLASTIC BAG, INJECTION (ML) INTRAVENOUS EVERY 8 HOURS
Status: DISCONTINUED | OUTPATIENT
Start: 2020-01-08 | End: 2020-01-08 | Stop reason: HOSPADM

## 2020-01-08 RX ADMIN — VANCOMYCIN HYDROCHLORIDE 1250 MG: 10 INJECTION, POWDER, LYOPHILIZED, FOR SOLUTION INTRAVENOUS at 09:12

## 2020-01-08 RX ADMIN — PIPERACILLIN SODIUM AND TAZOBACTAM SODIUM 3.38 G: 3; .375 INJECTION, POWDER, LYOPHILIZED, FOR SOLUTION INTRAVENOUS at 01:22

## 2020-01-08 RX ADMIN — DEXAMETHASONE SODIUM PHOSPHATE 10 MG: 10 INJECTION, SOLUTION INTRAMUSCULAR; INTRAVENOUS at 12:07

## 2020-01-08 RX ADMIN — PIPERACILLIN SODIUM AND TAZOBACTAM SODIUM 3.38 G: 3; .375 INJECTION, POWDER, LYOPHILIZED, FOR SOLUTION INTRAVENOUS at 10:27

## 2020-01-08 RX ADMIN — VANCOMYCIN HYDROCHLORIDE 1250 MG: 10 INJECTION, POWDER, LYOPHILIZED, FOR SOLUTION INTRAVENOUS at 01:17

## 2020-01-08 RX ADMIN — FAMOTIDINE 20 MG: 10 INJECTION, SOLUTION INTRAVENOUS at 04:22

## 2020-01-08 RX ADMIN — DEXAMETHASONE SODIUM PHOSPHATE 10 MG: 10 INJECTION, SOLUTION INTRAMUSCULAR; INTRAVENOUS at 04:21

## 2020-01-08 NOTE — PROGRESS NOTES
POD 1 I&D sublingual abscess. Reports minimal pain. Breathing normally. Swelling is less. Visit Vitals  /62 (BP 1 Location: Left arm, BP Patient Position: At rest)   Pulse 75   Temp 97.7 °F (36.5 °C)   Resp 16   Wt 99.3 kg (218 lb 14.4 oz)   SpO2 94%   BMI 28.11 kg/m²    NAD  Sublingual space is soft. Tongue edema much less    Gram stain: Gram + cocci in clusters and chains    A/p:  Much improved after I&D. Good response to steroids and antibiotics. Transition to Clindamycin for good Gram + coverage. Change steroid to medrol dosepak for discharge  Advance diet to soft diet this morning. If tolerating diet, safe for discharge home this afternoon with one week return appointment to my office. Sheree Silva [place], [address] [place] [address], [facility]  [address] [facility], [address] [facility], [address] [place] [address]   (g) [phone] (p) [phone]

## 2020-01-08 NOTE — PROGRESS NOTES
I have reviewed discharge instructions with the patient and spouse. The patient and spouse verbalized understanding. Current Discharge Medication List      START taking these medications    Details   clindamycin (CLEOCIN) 300 mg capsule Take 2 Caps by mouth three (3) times daily. Qty: 42 Cap, Refills: 0      methylPREDNISolone (MEDROL, LOLA,) 4 mg tablet Take as directed by package. Qty: 1 Dose Pack, Refills: 0         STOP taking these medications       amoxicillin (AMOXIL) 500 mg capsule Comments:   Reason for Stopping:         naproxen (NAPROSYN) 500 mg tablet Comments:   Reason for Stopping:         ondansetron (ZOFRAN ODT) 8 mg disintegrating tablet Comments:   Reason for Stopping:           used  for discharge!

## 2020-01-08 NOTE — PROGRESS NOTES
Per patient's request (interpreted by Bertrand Burton), a referral has been sent to [employer] for financial screening. A financial assistance application summary sheet was provided to the patient, which guides the patient on application process.      AUGUST Santiago

## 2020-01-08 NOTE — PROGRESS NOTES
Cross Cultural Services            Provided  services to [employer] during discharge instructions. Patient had the opportunity to ask questions and all questions were answered.                 Miracle Randle [facility] Certified    can be requested at: [email or web address]

## 2020-01-08 NOTE — PROGRESS NOTES
Pharmacist Note - Vancomycin Dosing  Therapy day 2  Indication: Right floor of mouth/lingual abscess  Current regimen: 1250 mg IV Q8H    A Trough Level resulted at 9.3 mcg/mL which was obtained 8 hrs post-dose. Goal trough: 10 - 15 mcg/mL     Plan: Change to 1500 mg IV Q8H . Pharmacy will continue to monitor this patient daily for changes in clinical status and renal function.

## 2020-01-08 NOTE — PROGRESS NOTES
Cross Cultural Services        Provided language services to patient and assessed for language and cultural needs. Patient stated that he has an insurance, but that it only covers 20% of costs and that he was very worried about the upcoming bills. Made referral to TRACEY Benjamin who will send referral to [facility] for financial screening.                     Gordy Haley, [facility] Certified    can be requested at: [email or web address]

## 2020-01-08 NOTE — DISCHARGE SUMMARY
Discharge Summary       PATIENT ID: Asad Pinedo  MRN: [id number]   YOB: 1975    DATE OF ADMISSION: 1/6/2020  9:25 PM    DATE OF DISCHARGE: 1/8/2020   PRIMARY CARE PROVIDER: Racquel Lees MD     ATTENDING PHYSICIAN: Jilda Gowers  DISCHARGING PROVIDER: Merrick He MD    To contact this individual call [phone] and ask the  to page. If unavailable ask to be transferred the Adult Hospitalist Department. CONSULTATIONS: None    PROCEDURES/SURGERIES: Procedure(s):  ORAL MASS INCISION AND DRAINAGE    ADMITTING DIAGNOSES & HOSPITAL COURSE:     History of present illness    This is a 66-year-old man with no significant past medical history, was in his usual state of health until about a week ago when the patient developed sore throat.  The patient described the sore throat as a swelling and pain in his throat with difficulty with swallowing, was seen at the emergency room a couple of days later.  The patient was treated with nonsteroidal anti-inflammatory drugs with no significant improvement.  The patient subsequently went to the emergency room at [facility] where CT scan of the neck was obtained and shows abscess collection right floor of the mouth. [facility] emergency room physician consulted ENT surgeon who advised that the patient be transferred to [facility] for him to do consultation on the patient.  The patient was then transferred to [facility] Emergency Room.  The patient was seen by the ENT surgeon in the emergency room and recommended admission to the hospitalist service as well as starting the patient on vancomycin, Zosyn and Decadron.  The patient was referred to the hospitalist service for that purpose.  No record of prior admission to this hospital.    Hospital course    Patient was admitted for further evaluation. Patient evaluated by ENT and subsequently underwent I&D of the abscess located at the floor of the mouth.   Patient did well postoperatively. Patient was treated with IV Zosyn and ENT recommended changing to clindamycin upon discharge. Patient to follow-up with ENT 1 week for reevaluation. DISCHARGE DIAGNOSES / PLAN:      1. Oral abscess     ADDITIONAL CARE RECOMMENDATIONS:     None     PENDING TEST RESULTS:   At the time of discharge the following test results are still pending: None    FOLLOW UP APPOINTMENTS:    Follow-up Information     Follow up With Specialties Details Why Contact Info    Ramsey Mclaughlin, [facility]   [address] [place] [address] [address] [facility] [address]      Davi Burton MD Otolaryngology In 1 week  [address] [facility]  [address] [facility]  [phone]               DIET: Regular Diet  Oral Nutritional Supplements: No Oral Supplement prescribed    ACTIVITY: Activity as tolerated    WOUND CARE: None    EQUIPMENT needed: None      DISCHARGE MEDICATIONS:  Current Discharge Medication List      START taking these medications    Details   clindamycin (CLEOCIN) 300 mg capsule Take 2 Caps by mouth three (3) times daily. Qty: 42 Cap, Refills: 0      methylPREDNISolone (MEDROL, LOLA,) 4 mg tablet Take as directed by package. Qty: 1 Dose Pack, Refills: 0         STOP taking these medications       amoxicillin (AMOXIL) 500 mg capsule Comments:   Reason for Stopping:         naproxen (NAPROSYN) 500 mg tablet Comments:   Reason for Stopping:         ondansetron (ZOFRAN ODT) 8 mg disintegrating tablet Comments:   Reason for Stopping:                 NOTIFY YOUR PHYSICIAN FOR ANY OF THE FOLLOWING:   Fever over 101 degrees for 24 hours. Chest pain, shortness of breath, fever, chills, nausea, vomiting, diarrhea, change in mentation, falling, weakness, bleeding. Severe pain or pain not relieved by medications. Or, any other signs or symptoms that you may have questions about.     DISPOSITION:    Home With:   OT  PT  HH  RN       Long term SNF/Inpatient Rehab    Independent/assisted living    Hospice    Other: PATIENT CONDITION AT DISCHARGE:     Functional status    Poor     Deconditioned     Independent      Cognition     Lucid     Forgetful     Dementia      Catheters/lines (plus indication)    Reveles     PICC     PEG     None      Code status     Full code     DNR      PHYSICAL EXAMINATION AT DISCHARGE:  General:          Alert, cooperative, no distress, appears stated age. HEENT:           Atraumatic, anicteric sclerae, pink conjunctivae                          No oral ulcers, mucosa moist, throat clear, dentition fair  Neck:               Supple, symmetrical  Lungs:             Clear to auscultation bilaterally. No Wheezing or Rhonchi. No rales. Chest wall:      No tenderness  No Accessory muscle use. Heart:              Regular  rhythm,  No  murmur   No edema  Abdomen:        Soft, non-tender. Not distended. Bowel sounds normal  Extremities:     No cyanosis. No clubbing,                            Skin turgor normal, Capillary refill normal  Skin:                Not pale. Not Jaundiced  No rashes   Psych:             Not anxious or agitated.   Neurologic:      Alert, moves all extremities, answers questions appropriately and responds to commands       CHRONIC MEDICAL DIAGNOSES:  Problem List as of 1/8/2020 Date Reviewed: 1/7/2020          Codes Class Noted - Resolved    * (Principal) Abscess of oral space ICD-10-CM: K12.2  ICD-9-CM: 528.3  1/7/2020 - Present              Greater than 30 minutes were spent with the patient on counseling and coordination of care    Signed:   Adilene Andrea MD  1/8/2020  12:53 PM

## 2020-01-08 NOTE — PROGRESS NOTES
Problem: Pain  Goal: *Control of Pain  Outcome: Progressing Towards Goal  Goal: *PALLIATIVE CARE:  Alleviation of Pain  Outcome: Progressing Towards Goal

## 2020-01-08 NOTE — DISCHARGE INSTRUCTIONS
POSTOPERTIVE INSTRUCTIONS:      ACTIVITIES   You are cleared to resume normal activities as able. No heavy lifting >10 pounds x 1 week    DIET   Advance your diet as tolerated to a regular diet. MEDICATIONS  Resume your normal medications as prescribed by your primary doctor. Medrol Dose Pack or Steroid Taper   Take as prescribed  Antibiotic:   take as prescribed. Extra Strength Tylenol (acetaminophen)   You may take 2 tablets every 6 hours as needed for pain.  Do not take while still taking your narcotic pain medication. WHEN TO CALL YOUR DOCTOR   A temperature greater than 100 F or 38 C.  Swelling that is getting worse on the tongue or under the tongue   Blurred vision.  Stiff neck.  Extreme drowsiness after the first day.  Vomiting lasting more than 4 hours.  Any other symptoms which concern you. If you have any questions or concerns call my clinic at [phone]. Call 911 if you have chest pain or shortness of breath    FOLLOW UP:  Please call Dr. Ruslan Welch office and request a return appointment for 7-10 days after discharge. [facility], [address], [facility]   [address] [facility], [address]   (G) [phone]  (B) [phone]

## 2020-01-08 NOTE — OP NOTES
[address]   OPERATIVE REPORT    Name:  Мария Early  MR#:  [id number]  :  1975  ACCOUNT #:  [de-identified]  DATE OF SERVICE:  2020    PREOPERATIVE DIAGNOSIS:  Right floor of mouth/lingual abscess. POSTOPERATIVE DIAGNOSIS:  Right floor of mouth/lingual abscess. PROCEDURE PERFORMED:  Incision and drainage of right floor of mouth abscess. SURGEON:  Chapito Urrutia MD    ASSISTANT:  None. ANESTHESIA:  General nasotracheal intubation. COMPLICATIONS:  None. SPECIMENS REMOVED:  Right floor of mouth culture for aerobic and anaerobic. IMPLANTS:  None. ESTIMATED BLOOD LOSS:  5 mL. IV FLUIDS:  1 liter. DRAINS:  None. DISPOSITION:  To PACU, then medical floor. CONDITION:  Stable. BRIEF HISTORY OF PRESENT ILLNESS:  The patient is a 40-year-old gentleman who presented to the emergency department last evening with swelling on his floor of mouth. A CT scan confirmed he had an abscess. He was treated with steroids and antibiotics overnight and presents for incision and drainage. PROCEDURE:  The patient was identified in the preoperative holding area and brought to the operating room where he was placed in the supine position. General anesthesia was induced and he was nasotracheally intubated from the right nostril. A time-out was performed in which we identified his name, medical record number, and the proposed procedure. He was prepped and draped in standard fashion. A total of 2 mL of 1% lidocaine with 1:100,000 parts of epinephrine were injected into the mucosa of right floor of mouth and ventral tongue. An 18-gauge needle on a syringe was then used to aspirate to locate the abscess cavity. After the cavity was aspirated and cultures sent, a 1 cm incision was made with the Bovie cautery and the wound was probed with a mosquito clamp. A total of 8 mL purulent drainage was expressed. The area was then irrigated with saline. Hemostasis was verified. Care was then turned over to my Anesthesia team, who weaned the anesthetic and extubated the patient. He was transferred to the PACU in stable condition.         Margoth Jackson MD      [id number]  D:  01/07/2020 13:48  T:  01/07/2020 22:28  JOB #:  [id number]

## 2020-01-10 LAB
BACTERIA SPEC CULT: ABNORMAL
GRAM STN SPEC: ABNORMAL
SERVICE CMNT-IMP: ABNORMAL

## 2020-01-11 LAB
BACTERIA SPEC CULT: NORMAL
SERVICE CMNT-IMP: NORMAL

## 2020-01-14 NOTE — ANESTHESIA POSTPROCEDURE EVALUATION
Procedure(s):  ORAL MASS INCISION AND DRAINAGE.     general    <BSHSIANPOST>    Vitals Value Taken Time   /81 1/7/2020  3:00 PM   Temp 36.6 °C (97.9 °F) 1/7/2020  2:41 PM   Pulse 75 1/7/2020  3:00 PM   Resp 12 1/7/2020  3:00 PM   SpO2 95 % 1/7/2020  3:00 PM

## 2020-01-24 NOTE — TELEPHONE ENCOUNTER
Called to patient to inform him of the results and to go to the ED (communicated understanding). Also talked with Emiliana Leach Dr ED physician to expect patient. 22

## 2025-01-27 NOTE — ED NOTES
Critical Care transport here to transport pt to [facility] ED  Paperwork given to medic  IV NS infusing and time of transfer, Zosyn infusing at time of transfer  Wife accompanying pt Rx Refill Note  Requested Prescriptions     Pending Prescriptions Disp Refills    citalopram (CeleXA) 40 MG tablet [Pharmacy Med Name: CITALOPRAM HBR 40 MG TABLET] 90 tablet 3     Sig: TAKE 1 TABLET BY MOUTH EVERY DAY      Last office visit with prescribing clinician: 12/16/24   Last telemedicine visit with prescribing clinician: Visit date not found   Next office visit with prescribing clinician: 2/4/2025     Kat Mathew MA  01/27/25, 07:44 CST

## (undated) DEVICE — STERILE POLYISOPRENE POWDER-FREE SURGICAL GLOVES: Brand: PROTEXIS

## (undated) DEVICE — SUTURE PERMAHAND SZ 2-0 L18IN NONABSORBABLE BLK L26MM PS 1588H

## (undated) DEVICE — NEEDLE HYPO 25GA L1.5IN BLU POLYPR HUB S STL REG BVL STR

## (undated) DEVICE — INSULATED BLADE ELECTRODE: Brand: EDGE

## (undated) DEVICE — INFECTION CONTROL KIT SYS

## (undated) DEVICE — PACK,EENT,TURBAN DRAPE,PK II: Brand: MEDLINE

## (undated) DEVICE — Device

## (undated) DEVICE — SYR 10ML CTRL LR LCK NSAF LF --

## (undated) DEVICE — GARMENT,MEDLINE,DVT,INT,CALF,MED, GEN2: Brand: MEDLINE

## (undated) DEVICE — SOLUTION IV 1000ML 0.9% SOD CHL

## (undated) DEVICE — NEEDLE HYPO 18GA L1.5IN PNK S STL HUB POLYPR SHLD REG BVL

## (undated) DEVICE — REM POLYHESIVE ADULT PATIENT RETURN ELECTRODE: Brand: VALLEYLAB